# Patient Record
Sex: FEMALE | Race: WHITE | NOT HISPANIC OR LATINO | Employment: FULL TIME | URBAN - METROPOLITAN AREA
[De-identification: names, ages, dates, MRNs, and addresses within clinical notes are randomized per-mention and may not be internally consistent; named-entity substitution may affect disease eponyms.]

---

## 2017-10-26 ENCOUNTER — TELEPHONE (OUTPATIENT)
Dept: FAMILY MEDICINE | Facility: CLINIC | Age: 32
End: 2017-10-26

## 2017-10-26 ENCOUNTER — OFFICE VISIT (OUTPATIENT)
Dept: FAMILY MEDICINE | Facility: CLINIC | Age: 32
End: 2017-10-26
Payer: COMMERCIAL

## 2017-10-26 VITALS
SYSTOLIC BLOOD PRESSURE: 116 MMHG | DIASTOLIC BLOOD PRESSURE: 70 MMHG | HEART RATE: 81 BPM | HEIGHT: 63 IN | BODY MASS INDEX: 20.16 KG/M2 | OXYGEN SATURATION: 99 % | WEIGHT: 113.75 LBS | TEMPERATURE: 99 F

## 2017-10-26 DIAGNOSIS — J34.0 ABSCESS OF NOSE: Primary | ICD-10-CM

## 2017-10-26 DIAGNOSIS — L03.211 FACIAL CELLULITIS: ICD-10-CM

## 2017-10-26 PROCEDURE — 96372 THER/PROPH/DIAG INJ SC/IM: CPT | Mod: S$GLB,,, | Performed by: INTERNAL MEDICINE

## 2017-10-26 PROCEDURE — 99999 PR PBB SHADOW E&M-EST. PATIENT-LVL III: CPT | Mod: PBBFAC,,, | Performed by: INTERNAL MEDICINE

## 2017-10-26 PROCEDURE — 99214 OFFICE O/P EST MOD 30 MIN: CPT | Mod: 25,S$GLB,, | Performed by: INTERNAL MEDICINE

## 2017-10-26 RX ORDER — CEFTRIAXONE 250 MG/1
250 INJECTION, POWDER, FOR SOLUTION INTRAMUSCULAR; INTRAVENOUS
Status: COMPLETED | OUTPATIENT
Start: 2017-10-26 | End: 2017-10-26

## 2017-10-26 RX ORDER — CLINDAMYCIN HYDROCHLORIDE 300 MG/1
300 CAPSULE ORAL EVERY 6 HOURS
Qty: 28 CAPSULE | Refills: 0 | Status: SHIPPED | OUTPATIENT
Start: 2017-10-26 | End: 2017-11-02

## 2017-10-26 RX ORDER — MUPIROCIN 20 MG/G
OINTMENT TOPICAL 2 TIMES DAILY
Qty: 1 G | Refills: 0 | Status: SHIPPED | OUTPATIENT
Start: 2017-10-26 | End: 2021-06-07

## 2017-10-26 RX ADMIN — CEFTRIAXONE 250 MG: 250 INJECTION, POWDER, FOR SOLUTION INTRAMUSCULAR; INTRAVENOUS at 09:10

## 2017-10-26 NOTE — PROGRESS NOTES
SUBJECTIVE     Chief Complaint   Patient presents with    Facial Swelling     nasal swelling x 4 days. painful       HPI  Greg Borja is a 31 y.o. female with multiple medical diagnoses as listed in the medical history and problem list that presents for evaluation of facial swelling x 4 days. Pt reports a feeling of a pimple in her nose  night. Her nose started to swell Monday. She applied peroxide on a q-tip. She continued with warm compresses and even tried apple cider vinegar without improvement. Yesterday the swelling spread just beneath the R eye. She reports pain in the R nostril that sharp and stiff at a 5-6/10 and constant in nature. She has taking Ibuprofen 200 mg x 3 tabs every 6-8 hours with some improvement of symptoms. Denies any fever, chills, or night sweats.     PAST MEDICAL HISTORY:  Past Medical History:   Diagnosis Date    Diabetes mellitus type I     Diabetes mellitus, type 2        PAST SURGICAL HISTORY:  Past Surgical History:   Procedure Laterality Date     SECTION         SOCIAL HISTORY:  Social History     Social History    Marital status:      Spouse name: N/A    Number of children: N/A    Years of education: N/A     Occupational History    Not on file.     Social History Main Topics    Smoking status: Former Smoker     Quit date: 2009    Smokeless tobacco: Not on file    Alcohol use Yes      Comment: occasionally    Drug use: Unknown    Sexual activity: Not on file     Other Topics Concern    Not on file     Social History Narrative    No narrative on file       FAMILY HISTORY:  History reviewed. No pertinent family history.    ALLERGIES AND MEDICATIONS: updated and reviewed.  Review of patient's allergies indicates:   Allergen Reactions    Hydrochlorothiazide Rash     Current Outpatient Prescriptions   Medication Sig Dispense Refill    JOLIVETTE 0.35 mg tablet       NOVOLOG 100 unit/mL injection       clindamycin (CLEOCIN) 300 MG capsule  "Take 1 capsule (300 mg total) by mouth every 6 (six) hours. 28 capsule 0    mupirocin calcium 2% nasl oint (BACTROBAN) 2 % Oint by Nasal route 2 (two) times daily. 1 g 0     Current Facility-Administered Medications   Medication Dose Route Frequency Provider Last Rate Last Dose    cefTRIAXone injection 250 mg  250 mg Intramuscular 1 time in Clinic/HOD Swapna Quinonez MD           ROS  Review of Systems   Constitutional: Negative for chills and fever.   HENT: Positive for facial swelling. Negative for hearing loss and sore throat.    Eyes: Negative for visual disturbance.   Respiratory: Negative for cough and shortness of breath.    Cardiovascular: Negative for chest pain, palpitations and leg swelling.   Gastrointestinal: Positive for nausea. Negative for abdominal pain, constipation, diarrhea and vomiting.   Genitourinary: Negative for dysuria, frequency and urgency.   Musculoskeletal: Negative for arthralgias, joint swelling and myalgias.   Skin: Negative for rash.        R nostril abscess   Neurological: Negative for headaches.   Psychiatric/Behavioral: Negative for agitation and confusion. The patient is not nervous/anxious.          OBJECTIVE     Physical Exam  Vitals:    10/26/17 0832   BP: 116/70   Pulse: 81   Temp: 98.7 °F (37.1 °C)    Body mass index is 20.47 kg/m².  Weight: 51.6 kg (113 lb 12.1 oz)   Height: 5' 2.5" (158.8 cm)     Physical Exam   Constitutional: She is oriented to person, place, and time. She appears well-developed and well-nourished. No distress.   HENT:   Head: Normocephalic and atraumatic.   Right Ear: Hearing, tympanic membrane and external ear normal.   Left Ear: Hearing, tympanic membrane and external ear normal.   Nose: Mucosal edema (right) and sinus tenderness (nasal bridge and R nostril TTP with area of fluctuance at the R nostril with small eschar on the lateral R nostril) present.   Mouth/Throat: Oropharynx is clear and moist. No uvula swelling. No posterior oropharyngeal " edema or posterior oropharyngeal erythema.   Eyes: Conjunctivae and EOM are normal. Right eye exhibits no discharge. Left eye exhibits no discharge. No scleral icterus.   Neck: Normal range of motion. Neck supple. No JVD present. No tracheal deviation present.   Cardiovascular: Normal rate, regular rhythm and intact distal pulses.  Exam reveals no gallop and no friction rub.    No murmur heard.  Pulmonary/Chest: Effort normal and breath sounds normal. No respiratory distress. She has no wheezes.   Abdominal: Soft. Bowel sounds are normal. She exhibits no distension and no mass. There is no tenderness. There is no rebound and no guarding.   Musculoskeletal: Normal range of motion. She exhibits edema (R nostril and inferior to R eye). She exhibits no tenderness or deformity.   Neurological: She is alert and oriented to person, place, and time. She exhibits normal muscle tone. Coordination normal.   Skin: Skin is warm and dry. No rash noted. There is erythema (R nostril and inferior to R eye).   Psychiatric: She has a normal mood and affect. Her behavior is normal. Judgment and thought content normal.         Health Maintenance       Date Due Completion Date    Lipid Panel 1984 ---    TETANUS VACCINE 10/27/2002 ---    Pap Smear with HPV Cotest 10/27/2005 ---    Influenza Vaccine 08/01/2017 ---            ASSESSMENT     31 y.o. female with     1. Abscess of nose    2. Facial cellulitis        PLAN:     1. Abscess of nose  - Pt advised to take Abx to completion  - mupirocin calcium 2% nasl oint (BACTROBAN) 2 % Oint; by Nasal route 2 (two) times daily.  Dispense: 1 g; Refill: 0  - clindamycin (CLEOCIN) 300 MG capsule; Take 1 capsule (300 mg total) by mouth every 6 (six) hours.  Dispense: 28 capsule; Refill: 0  - cefTRIAXone injection 250 mg; Inject 250 mg into the muscle one time.  - Seek medical attention for any worsening erythema with red streaking, edema, drainage, pain/tenderness, fever, chills, or night  sweats    2. Facial cellulitis  - clindamycin (CLEOCIN) 300 MG capsule; Take 1 capsule (300 mg total) by mouth every 6 (six) hours.  Dispense: 28 capsule; Refill: 0  - cefTRIAXone injection 250 mg; Inject 250 mg into the muscle one time.        RTC in 5-7 days for any acute worsening of current condition or failure to improve     Swapna Quinonez MD  10/26/2017 8:47 AM        No Follow-up on file.

## 2017-10-26 NOTE — TELEPHONE ENCOUNTER
----- Message from Waleska Ocampo sent at 10/26/2017  9:28 AM CDT -----  Contact: Parisa with [facility]  Refill on the regular mupirocin calcium 2% nasl oint (BACTROBAN) 2 % Oint . Parisa can be reached at [phone].        Thanks,

## 2017-10-26 NOTE — TELEPHONE ENCOUNTER
----- Message from Nadine Weeks sent at 10/26/2017 12:24 PM CDT -----  Nahum madrigal [facility] would like to discuss patient's medication. She states that mupirocin calcium 2% nasl oint (BACTROBAN) 2 % Oint has been discontinued by the manu factor and is requesting to prescribe the regular one. She can be reached at [phone].

## 2017-10-26 NOTE — TELEPHONE ENCOUNTER
Patient requesting medication change, per pharmacy.  Need to have bactroban changed to another medication.  Please advise.

## 2017-10-26 NOTE — TELEPHONE ENCOUNTER
----- Message from Pily Hamilton sent at 10/26/2017 10:25 AM CDT -----  Contact: Self   Patient says the pharmacy told her they need a change in her medication because of her insurance. Please call at [phone].      mupirocin calcium 2% nasl oint (BACTROBAN) 2 % Oint

## 2018-12-13 LAB
HPV, HIGH-RISK: NEGATIVE
PAP SMEAR: ABNORMAL

## 2019-08-08 ENCOUNTER — PATIENT OUTREACH (OUTPATIENT)
Dept: ADMINISTRATIVE | Facility: HOSPITAL | Age: 34
End: 2019-08-08

## 2019-08-08 RX ORDER — INSULIN ASPART 100 [IU]/ML
INJECTION, SUSPENSION SUBCUTANEOUS
COMMUNITY
End: 2019-08-30

## 2019-08-08 RX ORDER — INSULIN ASPART 100 [IU]/ML
INJECTION, SOLUTION INTRAVENOUS; SUBCUTANEOUS
COMMUNITY
Start: 2018-11-09 | End: 2020-01-29

## 2019-08-08 RX ORDER — ACETAMINOPHEN AND CODEINE PHOSPHATE 120; 12 MG/5ML; MG/5ML
0.35 SOLUTION ORAL
COMMUNITY
Start: 2018-12-06

## 2019-08-26 LAB
LEFT EYE DM RETINOPATHY: NEGATIVE
RIGHT EYE DM RETINOPATHY: NEGATIVE

## 2019-08-30 ENCOUNTER — OFFICE VISIT (OUTPATIENT)
Dept: ENDOCRINOLOGY | Facility: CLINIC | Age: 34
End: 2019-08-30
Payer: COMMERCIAL

## 2019-08-30 ENCOUNTER — PATIENT MESSAGE (OUTPATIENT)
Dept: ENDOCRINOLOGY | Facility: CLINIC | Age: 34
End: 2019-08-30

## 2019-08-30 VITALS
DIASTOLIC BLOOD PRESSURE: 70 MMHG | HEART RATE: 80 BPM | WEIGHT: 119.06 LBS | SYSTOLIC BLOOD PRESSURE: 116 MMHG | BODY MASS INDEX: 21.91 KG/M2 | HEIGHT: 62 IN

## 2019-08-30 DIAGNOSIS — E10.65 TYPE 1 DIABETES MELLITUS WITH HYPERGLYCEMIA: Primary | ICD-10-CM

## 2019-08-30 PROCEDURE — 3008F BODY MASS INDEX DOCD: CPT | Mod: CPTII,S$GLB,, | Performed by: INTERNAL MEDICINE

## 2019-08-30 PROCEDURE — 99204 OFFICE O/P NEW MOD 45 MIN: CPT | Mod: S$GLB,,, | Performed by: INTERNAL MEDICINE

## 2019-08-30 PROCEDURE — 99999 PR PBB SHADOW E&M-EST. PATIENT-LVL III: CPT | Mod: PBBFAC,,, | Performed by: INTERNAL MEDICINE

## 2019-08-30 PROCEDURE — 3008F PR BODY MASS INDEX (BMI) DOCUMENTED: ICD-10-PCS | Mod: CPTII,S$GLB,, | Performed by: INTERNAL MEDICINE

## 2019-08-30 PROCEDURE — 3045F PR MOST RECENT HEMOGLOBIN A1C LEVEL 7.0-9.0%: ICD-10-PCS | Mod: CPTII,S$GLB,, | Performed by: INTERNAL MEDICINE

## 2019-08-30 PROCEDURE — 99204 PR OFFICE/OUTPT VISIT, NEW, LEVL IV, 45-59 MIN: ICD-10-PCS | Mod: S$GLB,,, | Performed by: INTERNAL MEDICINE

## 2019-08-30 PROCEDURE — 99999 PR PBB SHADOW E&M-EST. PATIENT-LVL III: ICD-10-PCS | Mod: PBBFAC,,, | Performed by: INTERNAL MEDICINE

## 2019-08-30 PROCEDURE — 3045F PR MOST RECENT HEMOGLOBIN A1C LEVEL 7.0-9.0%: CPT | Mod: CPTII,S$GLB,, | Performed by: INTERNAL MEDICINE

## 2019-08-30 NOTE — PROGRESS NOTES
Subjective:      Chief Complaint: Type 1 diabetes mellitus    She is new to me and to endocrinology clinic    HPI: Greg Borja is a 33 y.o. female who is here for an initial evaluation for type 1 diabetes mellitus.    With regards to the diabetes:    Current symptoms/problems include hyperglycemia and hypoglycemia  and have been stable. Symptoms have been present for several years. She recently switched insurance, and was having trouble getting an appointment with her endocrinologist, so she decided to switch to [facility]. Because of her new job, things have been very hectic, and she admits to forgetting to check BG pre-meals and bolusing occasionally. Her glucose control has been more erratic than is typical due to this.    The patient was initially diagnosed with Type 1 diabetes mellitus: Age 11    Known diabetic complications: none  Cardiovascular risk factors: Diabetes  Current diabetic medications include:   Omnipod with U-100 Novolog: TDD 34.8 units/day  Insulin pump downloaded and reviewed. See media tab.                  Settings:  Basal rate:   12A: 0.6  5A:   1.3  8A:   0.7  3P:   1.2  6P:   0.85  Bolus dose/Carb ratio: 8.0  ISF for Correction Dose: 40  IAT: 2 hours  BG target      Pump type:    Infusion set type: Omnipod  Change infusion set: Every 3 days  Change insertion site: with change of infusion set.   Location of insertion site: Abdomen    Prior visit with dietician: has seen DM educator at [facility]    Current monitoring regimen: home blood tests - 3-4 times daily  Home blood sugar records: See above.  Any episodes of hypoglycemia? yes - a few after bolusing without a BG. Some after corrections.    Diabetic Health Maintenance:        HbA1c < 7.0%: No        Eye exam within last year: Yes in January, 2019 - normal        Foot exam within last year: Yes in January, 2019; denies any problems with her feet or neuropathy.        Urine microalbumin/creatinine within last year: Yes in January, 2019  - reported normal.        Blood pressure <130/80:  No          BP Readings from Last 3 Encounters:   08/30/19 116/70   10/26/17 116/70   05/22/15 110/60      Use of ACE/ARB: No, Not Indicated        Checked Weight: Yes; BM 21.77          Wt Readings from Last 10 Encounters:   08/30/19 54 kg (119 lb 0.8 oz)   10/26/17 51.6 kg (113 lb 12.1 oz)   05/22/15 50.1 kg (110 lb 8 oz)      Weight trend: stable        Checked lipids within last year: Checked by outside endocrinologist in January, reports they were normal. Not fasting today for repeat.   Statin drug:  No, Not Indicated        Glucagon emergency kit?: No        Medical alert tag?: No    Diabetes Management Status    Statin: Not taking  ACE/ARB: Not taking    Screening or Prevention Patient's value Goal Complete/Controlled?   HgA1C Testing and Control   Lab Results   Component Value Date    HGBA1C 7.7 (H) 08/30/2019      Annually/Less than 8% No   Lipid profile Most Recent Lipid Panel Health Maintenance Topic Completion: Not Found Annually No   LDL control No results found for: LDLCALC Annually/Less than 100 mg/dl  No   Nephropathy screening No results found for: LABMICR  No results found for: PROTEINUA Annually No   Blood pressure BP Readings from Last 1 Encounters:   08/30/19 116/70    Less than 140/90 Yes   Dilated retinal exam Most Recent Eye Exam Date: Not Found Annually No   Foot exam   Most Recent Foot Exam Date: Not Found Annually No        Lab Results   Component Value Date    HGBA1C 7.7 (H) 08/30/2019         Reviewed past medical, family, social history and updated as appropriate.    Review of Systems   Constitutional: Negative for unexpected weight change.   Eyes: Negative for visual disturbance.   Respiratory: Negative for shortness of breath.    Cardiovascular: Negative for chest pain.   Gastrointestinal: Negative for abdominal pain.   Genitourinary: Negative for urgency.   Musculoskeletal: Negative for arthralgias.   Skin: Negative for wound.    Neurological: Negative for headaches.   Hematological: Does not bruise/bleed easily.   Psychiatric/Behavioral: Negative for sleep disturbance.     Objective:     Vitals:    08/30/19 1123   BP: 116/70   Pulse: 80     BP Readings from Last 5 Encounters:   08/30/19 116/70   10/26/17 116/70   05/22/15 110/60         Physical Exam   Constitutional: She is oriented to person, place, and time. She appears well-developed.   HENT:   Right Ear: External ear normal.   Left Ear: External ear normal.   Nose: Nose normal.   Hearing grossly normal  Dentition grossly normal   Eyes: Right eye exhibits no discharge. Left eye exhibits no discharge.   Neck: No tracheal deviation present. No thyromegaly present.   Cardiovascular: Normal rate.   No murmur heard.  Pulmonary/Chest: Effort normal and breath sounds normal.   Abdominal: Soft. She exhibits no mass. There is no tenderness.   Musculoskeletal: She exhibits no edema.   No digital clubbing or extremity cyanosis  R ankle swelling - chronic.  Deferred foot exam as done by outside physician recently.   Neurological: She is alert and oriented to person, place, and time. Coordination normal.   Skin: No rash noted.   Some abdominal lipohypertrophy noted.   Omnipod in RLQ - clean and intact.   Psychiatric: She has a normal mood and affect. Her behavior is normal.   Alert and oriented to person, place, and situation.   Nursing note and vitals reviewed.      Wt Readings from Last 10 Encounters:   08/30/19 1123 54 kg (119 lb 0.8 oz)   10/26/17 0832 51.6 kg (113 lb 12.1 oz)   05/22/15 1608 50.1 kg (110 lb 8 oz)       Lab Results   Component Value Date    HGBA1C 7.7 (H) 08/30/2019     No results found for: CHOL, HDL, LDLCALC, TRIG, CHOLHDL  Lab Results   Component Value Date     08/30/2019    K 4.0 08/30/2019     08/30/2019    CO2 26 08/30/2019    GLU 75 08/30/2019    BUN 7 08/30/2019    CREATININE 0.8 08/30/2019    CALCIUM 8.5 (L) 08/30/2019    PROT 6.9 08/30/2019    ALBUMIN 3.9  08/30/2019    BILITOT 0.4 08/30/2019    ALKPHOS 63 08/30/2019    AST 22 08/30/2019    ALT 23 08/30/2019    ANIONGAP 7 (L) 08/30/2019    ESTGFRAFRICA >60.0 08/30/2019    EGFRNONAA >60.0 08/30/2019    TSH 0.867 08/30/2019      No results found for: MICALBCREAT    Assessment/Plan:     Type 1 diabetes mellitus with hyperglycemia  Reviewed goals of therapy are to get the best control we can without hypoglycemia. The major issue is missed or late boluses, which we discussed.     Will get 7-day CGMS to better evaluate BG control. Limited data on current pump settings due to infrequent BG checks during the day with recent job change and stress. She plans to start checking more often. She will consider getting on a personal CGM, but wants to think about it first. Dexcom G6 would be preferred.    Insufficient data to suggest significant setting changes at the moment. Will get CGMS data before deciding (next week).  Settings:  Basal rate:   12A: 0.6  5A:   1.3  8A:   0.7  3P:   1.2  6P:   0.85  Bolus dose/Carb ratio: 8.0  ISF for Correction Dose: 40  IAT: 2 hours > 2.5 hours  BG target   > 100-120     Reviewed patient's current insulin regimen. Clarified proper insulin dose and timing in relation to meals, etc. Advised to bolus for all carbs, and always check BG prior to meals.    Advised frequent self blood glucose monitoring. Patient encouraged to document glucose results and bring them to every clinic visit.    Hypoglycemia precautions discussed.     Close adherence to lifestyle changes recommended.      Eyes: UTD  Feet: UTD; will be due next visit  Kidneys: Will be due next visit  HbA1c: Check now  Lipids: repeat next visit (not fasting today)  Thyroid: Check TSH  Celiac: had celiac testing done at [facility] within the past few years - negative.      RTC in 6 months

## 2019-08-30 NOTE — ASSESSMENT & PLAN NOTE
Reviewed goals of therapy are to get the best control we can without hypoglycemia. The major issue is missed or late boluses, which we discussed.     Will get 7-day CGMS to better evaluate BG control. Limited data on current pump settings due to infrequent BG checks during the day with recent job change and stress. She plans to start checking more often. She will consider getting on a personal CGM, but wants to think about it first. Dexcom G6 would be preferred.    Insufficient data to suggest significant setting changes at the moment. Will get CGMS data before deciding (next week).  Settings:  Basal rate:   12A: 0.6  5A:   1.3  8A:   0.7  3P:   1.2  6P:   0.85  Bolus dose/Carb ratio: 8.0  ISF for Correction Dose: 40  IAT: 2 hours > 2.5 hours  BG target   > 100-120     Reviewed patient's current insulin regimen. Clarified proper insulin dose and timing in relation to meals, etc. Advised to bolus for all carbs, and always check BG prior to meals.    Advised frequent self blood glucose monitoring. Patient encouraged to document glucose results and bring them to every clinic visit.    Hypoglycemia precautions discussed.     Close adherence to lifestyle changes recommended.      Eyes: UTD  Feet: UTD; will be due next visit  Kidneys: Will be due next visit  HbA1c: Check now  Lipids: repeat next visit (not fasting today)  Thyroid: Check TSH  Celiac: had celiac testing done at [facility] within the past few years - negative.

## 2019-09-04 ENCOUNTER — CLINICAL SUPPORT (OUTPATIENT)
Dept: ENDOCRINOLOGY | Facility: CLINIC | Age: 34
End: 2019-09-04
Payer: COMMERCIAL

## 2019-09-04 DIAGNOSIS — E10.65 TYPE 1 DIABETES MELLITUS WITH HYPERGLYCEMIA: Primary | ICD-10-CM

## 2019-09-04 NOTE — PROGRESS NOTES
".DIABETES EDUCATOR NOTE   PLACEMENT OF FREESTYLE KALEY PRO SENSOR  CONTINOUS GLUCOSE MONITORING SYSTEM (CGMS)    Patient is here in clinic today for placement of continuous glucose monitoring sensor.      Each patient verified that they were here for CGMS procedure ordered by their provider and that they have a working glucose meter and supplies at home.   Patient will be provided with a Freestyle Kaley Sensor and a copy of the Continuous Glucose Monitoring Patient Log to fill out during the study.   A detailed  explanation of Continuous Glucose Monitoring was  provided. Patient informed that this is a blind procedure and that they will not actually see the blood sugar tracing in real time.  Reviewed with patient the [employer] patient education handout called "Your Freestyle Kaley Pro sensor: What you need to know" to review self-care during the study to avoid sensor loosening or removal ie... Bathing, Swimming, dressing, and exercising.   Instructed patient to check blood sugar using home glucometer and to record the following on provided patient log sheets:Blood sugar taken at home, Meals and snacks, Activity, and Diabetes medications taken and dosage    Patient was brought to a private location.  Arm for insertion was selected and prepared and allowed to dry. Glucose Sensor Serial Number [id number]  was inserted to back of patient's LEFT upper arm.    The following forms  were given and reviewed in detail with patient and all questions answered.   · Continuous Glucose Monitoring Patient Log #[id number]  · Freestyle [employer] Patient Handout "Your Freestyle Kaley Pro Sensor: What you need to know"     Instructions held in a group: Time: 15 min   Insertion of sensor done individually in private:  Time: 5 minutes         "

## 2019-09-10 ENCOUNTER — CLINICAL SUPPORT (OUTPATIENT)
Dept: ENDOCRINOLOGY | Facility: CLINIC | Age: 34
End: 2019-09-10
Payer: COMMERCIAL

## 2019-09-10 DIAGNOSIS — E10.65 TYPE 1 DIABETES MELLITUS WITH HYPERGLYCEMIA: ICD-10-CM

## 2019-09-10 PROCEDURE — 99499 NO LOS: ICD-10-PCS | Mod: S$GLB,,, | Performed by: INTERNAL MEDICINE

## 2019-09-10 PROCEDURE — 99499 UNLISTED E&M SERVICE: CPT | Mod: S$GLB,,, | Performed by: INTERNAL MEDICINE

## 2019-09-10 NOTE — PROGRESS NOTES
.DIABETES EDUCATOR NOTE   Return of the Freestyle Barbi Pro Sensor and Patient Log.    Patient returned to clinic today to return Glucose Sensor and signed patient log form used in [facility] procedure.    The CGMS Sensor will be scanned and downloaded. All reports will be imported into the patient's electronic medical record.    Endocrine Nurse Practitioner will complete data interpretation and make recommendations; will forward recommendations to the ordering provider for follow up with patient.

## 2019-09-12 ENCOUNTER — TELEPHONE (OUTPATIENT)
Dept: ENDOCRINOLOGY | Facility: CLINIC | Age: 34
End: 2019-09-12

## 2019-09-12 ENCOUNTER — PATIENT MESSAGE (OUTPATIENT)
Dept: ENDOCRINOLOGY | Facility: CLINIC | Age: 34
End: 2019-09-12

## 2019-09-12 DIAGNOSIS — E10.65 TYPE 1 DIABETES MELLITUS WITH HYPERGLYCEMIA: Primary | ICD-10-CM

## 2019-09-30 ENCOUNTER — TELEPHONE (OUTPATIENT)
Dept: ENDOCRINOLOGY | Facility: CLINIC | Age: 34
End: 2019-09-30

## 2020-01-29 DIAGNOSIS — E10.65 TYPE 1 DIABETES MELLITUS WITH HYPERGLYCEMIA: Primary | ICD-10-CM

## 2020-01-29 RX ORDER — INSULIN ASPART 100 [IU]/ML
INJECTION, SOLUTION INTRAVENOUS; SUBCUTANEOUS
Qty: 60 ML | Refills: 11 | Status: SHIPPED | OUTPATIENT
Start: 2020-01-29 | End: 2020-08-03 | Stop reason: SDUPTHER

## 2020-02-05 ENCOUNTER — TELEPHONE (OUTPATIENT)
Dept: ENDOCRINOLOGY | Facility: CLINIC | Age: 35
End: 2020-02-05

## 2020-02-11 ENCOUNTER — TELEPHONE (OUTPATIENT)
Dept: ENDOCRINOLOGY | Facility: CLINIC | Age: 35
End: 2020-02-11

## 2020-02-11 NOTE — TELEPHONE ENCOUNTER
Spoke with [employer] states they were waiting on an Addendum to Prior Authorization to be faxed. Third time faxing paperwork to company. Request came from [facility].

## 2020-03-04 LAB
HPV16+18+H RISK 12 DNA CVX-IMP: NEGATIVE
PAP SMEAR: NORMAL

## 2020-06-10 ENCOUNTER — OFFICE VISIT (OUTPATIENT)
Dept: FAMILY MEDICINE | Facility: CLINIC | Age: 35
End: 2020-06-10
Payer: COMMERCIAL

## 2020-06-10 VITALS
DIASTOLIC BLOOD PRESSURE: 80 MMHG | HEIGHT: 62 IN | OXYGEN SATURATION: 99 % | HEART RATE: 85 BPM | SYSTOLIC BLOOD PRESSURE: 128 MMHG | BODY MASS INDEX: 21.59 KG/M2 | WEIGHT: 117.31 LBS | TEMPERATURE: 99 F

## 2020-06-10 DIAGNOSIS — H65.91 FLUID LEVEL BEHIND TYMPANIC MEMBRANE OF RIGHT EAR: ICD-10-CM

## 2020-06-10 DIAGNOSIS — E10.65 TYPE 1 DIABETES MELLITUS WITH HYPERGLYCEMIA: ICD-10-CM

## 2020-06-10 DIAGNOSIS — Z23 NEED FOR PNEUMOCOCCAL VACCINATION: ICD-10-CM

## 2020-06-10 DIAGNOSIS — J01.90 ACUTE SINUSITIS WITH SYMPTOMS GREATER THAN 10 DAYS: Primary | ICD-10-CM

## 2020-06-10 PROCEDURE — 99999 PR PBB SHADOW E&M-EST. PATIENT-LVL III: ICD-10-PCS | Mod: PBBFAC,,, | Performed by: INTERNAL MEDICINE

## 2020-06-10 PROCEDURE — 3008F PR BODY MASS INDEX (BMI) DOCUMENTED: ICD-10-PCS | Mod: CPTII,S$GLB,, | Performed by: INTERNAL MEDICINE

## 2020-06-10 PROCEDURE — 3008F BODY MASS INDEX DOCD: CPT | Mod: CPTII,S$GLB,, | Performed by: INTERNAL MEDICINE

## 2020-06-10 PROCEDURE — 99999 PR PBB SHADOW E&M-EST. PATIENT-LVL III: CPT | Mod: PBBFAC,,, | Performed by: INTERNAL MEDICINE

## 2020-06-10 PROCEDURE — 99214 OFFICE O/P EST MOD 30 MIN: CPT | Mod: 25,S$GLB,, | Performed by: INTERNAL MEDICINE

## 2020-06-10 PROCEDURE — 90471 PNEUMOCOCCAL POLYSACCHARIDE VACCINE 23-VALENT =>2YO SQ IM: ICD-10-PCS | Mod: S$GLB,,, | Performed by: INTERNAL MEDICINE

## 2020-06-10 PROCEDURE — 3051F HG A1C>EQUAL 7.0%<8.0%: CPT | Mod: CPTII,S$GLB,, | Performed by: INTERNAL MEDICINE

## 2020-06-10 PROCEDURE — 99214 PR OFFICE/OUTPT VISIT, EST, LEVL IV, 30-39 MIN: ICD-10-PCS | Mod: 25,S$GLB,, | Performed by: INTERNAL MEDICINE

## 2020-06-10 PROCEDURE — 90471 IMMUNIZATION ADMIN: CPT | Mod: S$GLB,,, | Performed by: INTERNAL MEDICINE

## 2020-06-10 PROCEDURE — 90732 PNEUMOCOCCAL POLYSACCHARIDE VACCINE 23-VALENT =>2YO SQ IM: ICD-10-PCS | Mod: S$GLB,,, | Performed by: INTERNAL MEDICINE

## 2020-06-10 PROCEDURE — 3051F PR MOST RECENT HEMOGLOBIN A1C LEVEL 7.0 - < 8.0%: ICD-10-PCS | Mod: CPTII,S$GLB,, | Performed by: INTERNAL MEDICINE

## 2020-06-10 PROCEDURE — 90732 PPSV23 VACC 2 YRS+ SUBQ/IM: CPT | Mod: S$GLB,,, | Performed by: INTERNAL MEDICINE

## 2020-06-10 RX ORDER — AMOXICILLIN AND CLAVULANATE POTASSIUM 875; 125 MG/1; MG/1
1 TABLET, FILM COATED ORAL EVERY 12 HOURS
Qty: 20 TABLET | Refills: 0 | Status: SHIPPED | OUTPATIENT
Start: 2020-06-10 | End: 2020-06-20

## 2020-06-10 NOTE — PROGRESS NOTES
After obtaining consent, and per orders of Dr. Quinonez, injection of [id number] given by Maria Luz West. Patient instructed to remain in clinic for 20 minutes afterwards, and to report any adverse reaction to me immediately.

## 2020-06-10 NOTE — PROGRESS NOTES
SUBJECTIVE     Chief Complaint   Patient presents with    Sinusitis       HPI  Greg Borja is a 34 y.o. female with multiple medical diagnoses as listed in the medical history and problem list that presents for evaluation of URI x 1 month. Pt reports headaches, B/L eye itch/burn, nausea, dry cough, and scratchy throat. Denies any fever, chills, or night sweats. Pt has been taking Claritin, humidifier, eucalyptus, and vapor rub with minimal relief of symptoms. Denies any sick contacts/recent travel.    PAST MEDICAL HISTORY:  Past Medical History:   Diagnosis Date    Diabetes mellitus type I     Diabetes mellitus, type 2        PAST SURGICAL HISTORY:  Past Surgical History:   Procedure Laterality Date     SECTION         SOCIAL HISTORY:  Social History     Socioeconomic History    Marital status:      Spouse name: Not on file    Number of children: Not on file    Years of education: Not on file    Highest education level: Not on file   Occupational History    Not on file   Social Needs    Financial resource strain: Not hard at all    Food insecurity:     Worry: Never true     Inability: Never true    Transportation needs:     Medical: No     Non-medical: No   Tobacco Use    Smoking status: Former Smoker     Last attempt to quit: 2009     Years since quittin.0   Substance and Sexual Activity    Alcohol use: Yes     Frequency: 2-3 times a week     Drinks per session: 1 or 2     Binge frequency: Never     Comment: occasionally    Drug use: Not on file    Sexual activity: Not on file   Lifestyle    Physical activity:     Days per week: 2 days     Minutes per session: 30 min    Stress: Not at all   Relationships    Social connections:     Talks on phone: More than three times a week     Gets together: Twice a week     Attends [employer] service: Not on file     Active member of club or organization: Yes     Attends meetings of clubs or organizations: 1 to 4 times per year      Relationship status:    Other Topics Concern    Not on file   Social History Narrative    Not on file       FAMILY HISTORY:  History reviewed. No pertinent family history.    ALLERGIES AND MEDICATIONS: updated and reviewed.  Review of patient's allergies indicates:   Allergen Reactions    Hydrochlorothiazide Rash     Current Outpatient Medications   Medication Sig Dispense Refill    JOLIVETTE 0.35 mg tablet       NOVOLOG U-100 INSULIN ASPART 100 unit/mL injection USE AS DIRECTED VIA INSULIN PUMP. USE UP  UNITS PER DAY 60 mL 11    amoxicillin-clavulanate 875-125mg (AUGMENTIN) 875-125 mg per tablet Take 1 tablet by mouth every 12 (twelve) hours. for 10 days 20 tablet 0    mupirocin (BACTROBAN) 2 % ointment Apply topically 2 (two) times daily. (Patient not taking: Reported on 6/10/2020) 1 g 0    mupirocin calcium 2% nasl oint (BACTROBAN) 2 % Oint by Nasal route 2 (two) times daily. (Patient not taking: Reported on 6/10/2020) 1 g 0    norethindrone (MICRONOR) 0.35 mg tablet Take 0.35 mg by mouth.       No current facility-administered medications for this visit.        ROS  Review of Systems   Constitutional: Negative for activity change and unexpected weight change.   HENT: Negative for hearing loss, rhinorrhea and trouble swallowing.    Eyes: Negative for discharge and visual disturbance.   Respiratory: Negative for chest tightness and wheezing.    Cardiovascular: Negative for chest pain and palpitations.   Gastrointestinal: Positive for nausea. Negative for blood in stool, constipation, diarrhea and vomiting.   Endocrine: Negative for polydipsia and polyuria.   Genitourinary: Negative for difficulty urinating, dysuria, hematuria and menstrual problem.   Musculoskeletal: Negative for arthralgias, joint swelling and neck pain.   Skin: Negative for rash and wound.   Neurological: Positive for headaches. Negative for weakness.   Psychiatric/Behavioral: Negative for confusion and dysphoric mood.  "        OBJECTIVE     Physical Exam  Vitals:    06/10/20 1010   BP: 128/80   Pulse: 85   Temp: 98.5 °F (36.9 °C)    Body mass index is 21.45 kg/m².  Weight: 53.2 kg (117 lb 4.6 oz)   Height: 5' 2" (157.5 cm)     Physical Exam   Constitutional: She is oriented to person, place, and time. She appears well-developed and well-nourished. No distress.   HENT:   Head: Normocephalic and atraumatic.   Right Ear: Hearing and external ear normal. A middle ear effusion is present.   Left Ear: Hearing, tympanic membrane and external ear normal.   Nose: Right sinus exhibits maxillary sinus tenderness and frontal sinus tenderness. Left sinus exhibits maxillary sinus tenderness and frontal sinus tenderness.   Mouth/Throat: Oropharynx is clear and moist.   Eyes: Conjunctivae and EOM are normal. Right eye exhibits no discharge. Left eye exhibits no discharge. No scleral icterus.   Neck: Normal range of motion. Neck supple. No JVD present. No tracheal deviation present.   Cardiovascular: Normal rate, regular rhythm and intact distal pulses. Exam reveals no gallop and no friction rub.   No murmur heard.  Pulmonary/Chest: Effort normal and breath sounds normal. No respiratory distress. She has no wheezes.   Abdominal: Soft. Bowel sounds are normal. She exhibits no distension and no mass. There is no tenderness. There is no rebound and no guarding.   Musculoskeletal: Normal range of motion. She exhibits no edema, tenderness or deformity.   Neurological: She is alert and oriented to person, place, and time. She exhibits normal muscle tone. Coordination normal.   Skin: Skin is warm and dry. No rash noted. No erythema.   Psychiatric: She has a normal mood and affect. Her behavior is normal. Judgment and thought content normal.         Health Maintenance       Date Due Completion Date    Lipid Panel 1984 ---    Eye Exam 10/27/1994 ---    HIV Screening 10/27/1999 ---    TETANUS VACCINE 10/27/2002 ---    Pneumococcal Vaccine (Medium " Risk) (1 of 1 - PPSV23) 10/27/2003 ---    Urine Microalbumin 08/10/2016 8/10/2015    Hemoglobin A1c 02/29/2020 8/30/2019    Foot Exam 08/30/2020 8/30/2019    Influenza Vaccine (Season Ended) 09/01/2020 9/26/2011    Pap Smear with HPV Cotest 12/13/2021 12/13/2018            ASSESSMENT     34 y.o. female with     1. Acute sinusitis with symptoms greater than 10 days    2. Fluid level behind tympanic membrane of right ear    3. Type 1 diabetes mellitus with hyperglycemia    4. Need for pneumococcal vaccination        PLAN:     1. Acute sinusitis with symptoms greater than 10 days  - Pt advised to take Abx to completion  - Continue symptomatic treatment with rest, increase fluid intake, tylenol or ibuprofen PRN fever(temp >/= 100.4) or body aches. Okay to take OTC antihistamines, i.e. Bendaryl, Claritin, Allegra, etc. as needed.  - Okay to gargle with warm, salt water or use throat lozenges as needed  - amoxicillin-clavulanate 875-125mg (AUGMENTIN) 875-125 mg per tablet; Take 1 tablet by mouth every 12 (twelve) hours. for 10 days  Dispense: 20 tablet; Refill: 0    2. Fluid level behind tympanic membrane of right ear  - Pt to take antihistamines as above    3. Type 1 diabetes mellitus with hyperglycemia  - Continue management per Endocrinology  - CBC auto differential; Future  - Comprehensive metabolic panel; Future  - Hemoglobin A1C; Future  - TSH; Future  - Lipid Panel; Future  - Microalbumin/creatinine urine ratio; Future    4. Need for pneumococcal vaccination  - (In Office Administered) Pneumococcal Polysaccharide Vaccine (23 Valent) (SQ/IM)        RTC in 1-2 weeks as needed for any acute worsening of current condition or failure to improve       Swapna Quinonez MD  06/10/2020 10:18 AM        No follow-ups on file.

## 2020-06-16 ENCOUNTER — PATIENT OUTREACH (OUTPATIENT)
Dept: ADMINISTRATIVE | Facility: HOSPITAL | Age: 35
End: 2020-06-16

## 2020-06-24 ENCOUNTER — TELEPHONE (OUTPATIENT)
Dept: FAMILY MEDICINE | Facility: CLINIC | Age: 35
End: 2020-06-24

## 2020-06-24 DIAGNOSIS — R11.0 NAUSEA: ICD-10-CM

## 2020-06-24 DIAGNOSIS — J34.9 SINUS DISEASE: Primary | ICD-10-CM

## 2020-06-24 RX ORDER — PANTOPRAZOLE SODIUM 20 MG/1
20 TABLET, DELAYED RELEASE ORAL DAILY
Qty: 30 TABLET | Refills: 0 | Status: SHIPPED | OUTPATIENT
Start: 2020-06-24 | End: 2020-08-03

## 2020-06-24 NOTE — TELEPHONE ENCOUNTER
----- Message from Rhea Silva sent at 6/24/2020  2:28 PM CDT -----  Name of Who is Calling: JANIE JOHNSON [[id number]]    What is the request in detail: Would like to inform provider that she finished her course of antibiotics but she is still experiencing stomach issues, sinus issues. Please contact to further discuss and advise      Can the clinic reply by [facility]: yes    What Number to Call Back if not in [facility]: [phone] (preferred)

## 2020-06-24 NOTE — TELEPHONE ENCOUNTER
Trial course of Protonix called in in case 2/2 GERD. She'll need to call back if no improvement in 2-4 weeks.

## 2020-06-24 NOTE — TELEPHONE ENCOUNTER
Please inform pt that an ENT referral has been placed. Does she mean persistent nausea when she says stomach problems?

## 2020-06-24 NOTE — TELEPHONE ENCOUNTER
Last Office Visit Info:   The patient's last visit with Swapna Quinonez MD was on 6/10/2020.    The patient's last visit in current department was on 6/10/2020.        Last CBC Results:   Lab Results   Component Value Date    WBC 6.27 08/30/2019    HGB 13.7 08/30/2019    HCT 42.1 08/30/2019     08/30/2019       Last CMP Results  Lab Results   Component Value Date     08/30/2019    K 4.0 08/30/2019     08/30/2019    CO2 26 08/30/2019    BUN 7 08/30/2019    CREATININE 0.8 08/30/2019    CALCIUM 8.5 (L) 08/30/2019    ALBUMIN 3.9 08/30/2019    AST 22 08/30/2019    ALT 23 08/30/2019       Last Lipids  No results found for: CHOL, TRIG, HDL, LDLCALC    Last A1C  Lab Results   Component Value Date    HGBA1C 7.7 (H) 08/30/2019       Last TSH  Lab Results   Component Value Date    TSH 0.867 08/30/2019         Current Med Refills  Medication List with Changes/Refills   Current Medications    JOLIVETTE 0.35 MG TABLET           Start Date: 5/4/2015  End Date: --    MUPIROCIN (BACTROBAN) 2 % OINTMENT    Apply topically 2 (two) times daily.       Start Date: 10/26/2017End Date: --    MUPIROCIN CALCIUM 2% NASL OINT (BACTROBAN) 2 % OINT    by Nasal route 2 (two) times daily.       Start Date: 10/26/2017End Date: --    NORETHINDRONE (MICRONOR) 0.35 MG TABLET    Take 0.35 mg by mouth.       Start Date: 12/6/2018 End Date: --    NOVOLOG U-100 INSULIN ASPART 100 UNIT/ML INJECTION    USE AS DIRECTED VIA INSULIN PUMP. USE UP  UNITS PER DAY       Start Date: 1/29/2020 End Date: --

## 2020-06-24 NOTE — TELEPHONE ENCOUNTER
Pt states she is having recurrent nausea.  Not sure if caused by the drainage from sinus.  No other stomach problems

## 2020-06-26 ENCOUNTER — TELEPHONE (OUTPATIENT)
Dept: OTOLARYNGOLOGY | Facility: CLINIC | Age: 35
End: 2020-06-26

## 2020-06-26 NOTE — TELEPHONE ENCOUNTER
Called Patient and no answer, left voicemail regarding an appointment that was made today to see Dr. Hung on Monday.  Patient needs to have a Covid Test 72 Hours prior to this appointment.

## 2020-06-26 NOTE — TELEPHONE ENCOUNTER
Patient called me back and I rescheduled her appointment to see Dr. Hung for 7/8/20 due to needing a Covid Test.  Patient will go to Urgent Care within 72 hours prior

## 2020-07-01 ENCOUNTER — TELEPHONE (OUTPATIENT)
Dept: OTOLARYNGOLOGY | Facility: CLINIC | Age: 35
End: 2020-07-01

## 2020-07-01 NOTE — TELEPHONE ENCOUNTER
Called patient to reschedule COVID test to Thursday, 07/02/2020. Due to the extended time of getting the results back.

## 2020-07-02 ENCOUNTER — TELEPHONE (OUTPATIENT)
Dept: OTOLARYNGOLOGY | Facility: CLINIC | Age: 35
End: 2020-07-02

## 2020-07-02 DIAGNOSIS — J32.9 CHRONIC SINUSITIS, UNSPECIFIED LOCATION: Primary | ICD-10-CM

## 2020-07-02 NOTE — TELEPHONE ENCOUNTER
Tried calling patient regarding covid testing that is due prior to visit with the office. Testing request time right now is 4-5 days prior. Need patient to be tested today due to holiday weekend. No answer, left voicemail for patient to call the office. Pastora placed call to patient yesterday

## 2020-07-03 ENCOUNTER — CLINICAL SUPPORT (OUTPATIENT)
Dept: URGENT CARE | Facility: CLINIC | Age: 35
End: 2020-07-03
Payer: COMMERCIAL

## 2020-07-03 VITALS — OXYGEN SATURATION: 97 % | TEMPERATURE: 99 F | HEART RATE: 97 BPM

## 2020-07-03 DIAGNOSIS — J32.9 CHRONIC SINUSITIS, UNSPECIFIED LOCATION: ICD-10-CM

## 2020-07-03 PROCEDURE — U0003 INFECTIOUS AGENT DETECTION BY NUCLEIC ACID (DNA OR RNA); SEVERE ACUTE RESPIRATORY SYNDROME CORONAVIRUS 2 (SARS-COV-2) (CORONAVIRUS DISEASE [COVID-19]), AMPLIFIED PROBE TECHNIQUE, MAKING USE OF HIGH THROUGHPUT TECHNOLOGIES AS DESCRIBED BY CMS-2020-01-R: HCPCS

## 2020-07-04 LAB — SARS-COV-2 RNA RESP QL NAA+PROBE: NOT DETECTED

## 2020-07-08 ENCOUNTER — OFFICE VISIT (OUTPATIENT)
Dept: OTOLARYNGOLOGY | Facility: CLINIC | Age: 35
End: 2020-07-08
Payer: COMMERCIAL

## 2020-07-08 VITALS
DIASTOLIC BLOOD PRESSURE: 78 MMHG | BODY MASS INDEX: 22.11 KG/M2 | WEIGHT: 120.13 LBS | TEMPERATURE: 98 F | SYSTOLIC BLOOD PRESSURE: 122 MMHG | HEIGHT: 62 IN

## 2020-07-08 DIAGNOSIS — M95.0 NASAL VALVE COLLAPSE: ICD-10-CM

## 2020-07-08 DIAGNOSIS — J34.9 SINUS DISEASE: ICD-10-CM

## 2020-07-08 DIAGNOSIS — J34.2 NASAL SEPTAL DEVIATION: ICD-10-CM

## 2020-07-08 DIAGNOSIS — J34.3 HYPERTROPHY OF BOTH INFERIOR NASAL TURBINATES: ICD-10-CM

## 2020-07-08 DIAGNOSIS — J34.89 REFRACTORY OBSTRUCTION OF NASAL AIRWAY: Primary | ICD-10-CM

## 2020-07-08 DIAGNOSIS — J30.89 NON-SEASONAL ALLERGIC RHINITIS, UNSPECIFIED TRIGGER: ICD-10-CM

## 2020-07-08 DIAGNOSIS — R09.81 NASAL CONGESTION: ICD-10-CM

## 2020-07-08 PROCEDURE — 31231 NASAL ENDOSCOPY DX: CPT | Mod: S$GLB,,, | Performed by: OTOLARYNGOLOGY

## 2020-07-08 PROCEDURE — 31231 NASAL/SINUS ENDOSCOPY: ICD-10-PCS | Mod: S$GLB,,, | Performed by: OTOLARYNGOLOGY

## 2020-07-08 PROCEDURE — 3008F PR BODY MASS INDEX (BMI) DOCUMENTED: ICD-10-PCS | Mod: CPTII,S$GLB,, | Performed by: OTOLARYNGOLOGY

## 2020-07-08 PROCEDURE — 99204 PR OFFICE/OUTPT VISIT, NEW, LEVL IV, 45-59 MIN: ICD-10-PCS | Mod: 25,S$GLB,, | Performed by: OTOLARYNGOLOGY

## 2020-07-08 PROCEDURE — 3008F BODY MASS INDEX DOCD: CPT | Mod: CPTII,S$GLB,, | Performed by: OTOLARYNGOLOGY

## 2020-07-08 PROCEDURE — 99204 OFFICE O/P NEW MOD 45 MIN: CPT | Mod: 25,S$GLB,, | Performed by: OTOLARYNGOLOGY

## 2020-07-08 RX ORDER — AZELASTINE 1 MG/ML
1 SPRAY, METERED NASAL 2 TIMES DAILY
Qty: 30 ML | Refills: 2 | Status: SHIPPED | OUTPATIENT
Start: 2020-07-08 | End: 2021-06-07

## 2020-07-08 RX ORDER — FLUTICASONE PROPIONATE 50 MCG
2 SPRAY, SUSPENSION (ML) NASAL DAILY
Qty: 16 G | Refills: 2 | Status: SHIPPED | OUTPATIENT
Start: 2020-07-08 | End: 2020-10-06

## 2020-07-08 NOTE — PROGRESS NOTES
Subjective:      Greg Borja is a 34 y.o. female who was referred to me by Dr. Swapna Quinonez in consultation for sinusitis. She reports symptoms of constant nasal congestion and sinus and nasal pressure. Current sinonasal medications include claritin and using a humidifer with essential oils and VapoRub on her chest to help with decongestion. The last course of antibiotics was a long time ago related to an abscess on her nose with cellulitis and facial swelling, which since resolved and was a few years ago with no similar issues since. No significant history of recurrent sinusitis requiring oral antibiotics or oral steroids. Rarely has some color to nasal discharge with greenish or blood streaking, no significant epistaxis, mucous usually more clear.  She does not regularly use nasal decongestant sprays. She has tried claritin and allegra and many OTC medications including Tylenol sinus and other medications aimed at sinus and nasal symptoms. Reports that none of the medications seem to be working anymore. Has not tried saline irrigations, previously used nasal saline spray. Has tried flonase before but didn't tolerate because drained in her throat and made her nauseated.    She does not recall previously having allergy testing. She notices that dust bothers her and states she is highly allergic to dust.     She denies a history of asthma.    She denies a history of reflux symptoms.  She has not previously had an EGD.    She denies a diagnosis of obstructive sleep apnea.     She has not had sinonasal surgery.  Has only had tonsillectomy and adenoidectomy as child for recurrent strep.    She does not recall a prior history of nasal trauma.    QOL assessment deferred as currently not being collected due to Covid-19 transmission reduction protocols.    Past Medical History  She has a past medical history of Diabetes mellitus type I and Diabetes mellitus, type 2.    Past Surgical History  She has a past surgical  "history that includes  section and Tonsillectomy.    Family History  Her family history includes Diabetes in her maternal aunt and maternal uncle.    Social History  She reports that she quit smoking about 11 years ago. Her smoking use included cigarettes. She quit after 2.00 years of use. She does not have any smokeless tobacco history on file. She reports current alcohol use. She reports that she does not use drugs.    Allergies  She is allergic to hydrochlorothiazide.    Medications   She has a current medication list which includes the following prescription(s): mupirocin, mupirocin calcium 2% nasl oint, norethindrone, novolog u-100 insulin aspart, azelastine, fluticasone propionate, jolivette, and pantoprazole.    Review of Systems  Review of Systems   Constitutional: Positive for fatigue.   HENT: Positive for congestion, ear discharge, ear pain, postnasal drip, sinus pressure, sneezing and trouble swallowing. Negative for hoarse voice.    Eyes: Positive for redness and itching.   Respiratory: Positive for cough and shortness of breath.    Cardiovascular: Positive for leg swelling.   Gastrointestinal: Positive for diarrhea and nausea.   Endocrine: Positive for cold intolerance.   Genitourinary: Negative.    Musculoskeletal: Positive for neck stiffness.   Skin: Negative.    Allergic/Immunologic: Positive for environmental allergies.   Neurological: Negative.    Hematological: Negative.    Psychiatric/Behavioral: Negative.      Objective:     /78 (BP Location: Right arm, Patient Position: Sitting, BP Method: Large (Manual))   Temp 97.8 °F (36.6 °C)   Ht 5' 2" (1.575 m)   Wt 54.5 kg (120 lb 2.4 oz)   LMP 2020   BMI 21.98 kg/m²        Constitutional:   Vital signs are normal. She appears well-developed and well-nourished. She does not appear ill. No distress. Normal speech.  No hoarse voice, breathy voice and strained voice.      Head:  Normocephalic and atraumatic. No skin lesions. " Salivary glands normal.  Facial strength is normal.      Ears:    Right Ear: No drainage, swelling or tenderness. Tympanic membrane is not injected, not scarred, not perforated, not erythematous, not retracted and not bulging. No middle ear effusion. No decreased hearing is noted.   Left Ear: No drainage, swelling or tenderness. Tympanic membrane is scarred. Tympanic membrane is not injected, not perforated, not erythematous, not retracted and not bulging.  No middle ear effusion. No decreased hearing is noted.     Nose:  Mucosal edema and septal deviation present. No rhinorrhea, nose lacerations, sinus tenderness or polyps. No epistaxis. Turbinate hypertrophy.  Right sinus exhibits no maxillary sinus tenderness and no frontal sinus tenderness. Left sinus exhibits no maxillary sinus tenderness and no frontal sinus tenderness.     Mouth/Throat  Oropharynx clear and moist without lesions or asymmetry, normal uvula midline and lips, teeth, and gums normal. No oral lesions or trismus. No oropharyngeal exudate, posterior oropharyngeal edema or posterior oropharyngeal erythema.     Neck:  Trachea normal, phonation normal, full range of motion with neck supple and no adenopathy. No edema, no erythema, no stridor and no neck rigidity present.     Pulmonary/Chest:   Effort normal. No stridor.     Psychiatric:   She has a normal mood and affect. Her speech is normal and behavior is normal.     Neurological:   She has neurological normal, alert and oriented. No cranial nerve deficit or sensory deficit. Coordination and gait normal.     Skin:   No abrasions, lacerations, lesions, or rashes.       Procedure    Nasal endoscopy performed. Details of procedure described in separate procedure note.     Images obtained from endoscopy procedure today representing key findings (images also uploaded to media associated with patient's chart):     Left side:                      Right side:                        Data Reviewed    WBC  "(K/uL)   Date Value   08/30/2019 6.27     Eosinophil% (%)   Date Value   08/30/2019 0.3     Eos # (K/uL)   Date Value   08/30/2019 0.0     Platelets (K/uL)   Date Value   08/30/2019 244     Glucose (mg/dL)   Date Value   08/30/2019 75     No results found for: IGE    No sinus imaging available.    Assessment:     1. Refractory obstruction of nasal airway    2. Sinus disease    3. Nasal congestion    4. Hypertrophy of both inferior nasal turbinates    5. Nasal valve collapse    6. Nasal septal deviation    7. Non-seasonal allergic rhinitis, unspecified trigger         Plan:     I had a long discussion with the patient regarding her condition and the further workup and management options.      - I have recommended medical management with a combination of nasal saline irrigations, as well as both inhaled nasal steroid (fluticasone) and antihistamine (azelastine).   - Regarding nasal saline irrigations, specific instructions were provided on using this treatment. Explanation was given regarding performing these irrigations, and also provided as written instructions.   - Fluticasone and azelastine nasal spray was prescribed and specific instructions also given on proper use to maximize nasal delivery of the medication. I clarified that both are intended to be used, and emphasized the importance of daily use to achieve therapeutic effect. We discussed that fluticasone in particular is not intended as an occassional "as needed" treatment of symptoms and should be used daily, and that azelastine should also be used daily though may potentially be discontinued during asymptomatic seasons/periods when no allergy triggers are anticipated, if applicable. Written instructions regarding the use of fluticasone and azelastine nasal spray were also provided.    Discussed potential role for surgical interventions if refractory to adequate medical therapy including topical nasal steroid sprays and saline irrigations. These potential " sinonasal surgical interventions would include inferior turbinate reduction and septoplasty. Discussed that this would address partial obstruction and congestion primarily, but would not be directly anticipated to impact symptoms of post-nasal drip, sinus/facial pressure or pain, or other sinonasal concerns, though could secondarily improve additional symptoms by improving nasal drug and rinse delivery. The likely need for ongoing medical management to treat inflammatory etiologies of symptoms despite possible surgery was also reviewed, emphasizing that surgical procedures would address physical causes of obstruction and congestion and serve as an adjunct rather than replacement of medical management.    Follow up in about 5 weeks (around 8/12/2020).

## 2020-07-08 NOTE — PATIENT INSTRUCTIONS
"Information and instructions from your visit with me today:    · Start using fluticasone nasal spray:    This medication is the same as the Flonase brand nasal spray. Use this medication as instructed on the prescription, 2 sprays on each side of your nose once daily. You need to use this medication every day regardless of symptoms, as it takes time to work and get the benefits. It does not work on an "as needed" basis like taking a decongestant. Place the tip of the medication bottle in your nose and aim slightly up and out on each side to get medication high and deep into your nose and sinuses, and not have it all deposit in the very front of your nose. You can imagine aiming towards the back of your eyeball on each side for this, as opposed to straight back to the center of your nose and head.     · Start using azelastine nasal spray:    This medication is an antihistamine used to treat nasal symptoms of allergy, which works specifically in the nose unlike antihistamine pills which have more of an effect on the while body. Use this medication as instructed on the prescription, 1 spray on each side of your nose twice daily. You need to use this medication every day to prevent symptoms. If your symptoms only occur in a particular season, then the medication can be used seasonally instead of year long. Place the tip of the medication bottle in your nose and aim slightly up and out on each side to get medication high and deep into your nose and sinuses, imagine aiming towards the back of your eyeball on each side for this, just like the fluticasone nasal spray.    · Start nasal irrigations with saline solution:    SALINE SINUS RINSE ([employer] brand): You should do a full bottle, half on one side of your nose and half on the other, 1-2 times per day (or more if able to, you cannot do this too much). Follow the instructions on the box: mix the salt packet with clean water (bottle, previously boiled, distilled, etc -- " not tap water) to the line on the bottle to make the irrigation.      · Do not use nasal decongestant sprays such as Afrin or similar products.    It was nice meeting you today, and I look forward to helping you feel better soon. Please don't hesitate to call if you have any other questions or concerns, or if I can be of any assistance in the meantime.       [facility]udioN[facility]    Phone  [phone]    Fax      [phone]        Ford Hung MD  Rhinology, Sinus, and Skull Base Surgery  Department of Otorhinolaryngology

## 2020-07-08 NOTE — LETTER
July 26, 2020      Swapna Quinonez MD  [address] [address]  [address]  [place] [address]           [facility]  [address]   [place] [address]  Phone: [phone]          Patient: Greg Borja   MR Number: [id number]   YOB: 1985   Date of Visit: 7/8/2020       Dear Dr. Swapna Quinonez:    Thank you for referring Greg Borja to me for evaluation. Attached you will find relevant portions of my assessment and plan of care.    If you have questions, please do not hesitate to call me. I look forward to following Greg Borja along with you.    Sincerely,    Ford Hung MD    Enclosure  CC:  No Recipients    If you would like to receive this communication electronically, please contact [email or web address] or [phone] to request more information on [employer] Link access.    For providers and/or their staff who would like to refer a patient to [facility], please contact us through our one-stop-shop provider referral line, [facility], at [phone].    If you feel you have received this communication in error or would no longer like to receive these types of communications, please e-mail [email or web address]

## 2020-07-27 NOTE — PROCEDURES
Nasal/sinus endoscopy    Date/Time: 7/8/2020 3:00 PM  Performed by: Ford Hung MD  Authorized by: Ford Hung MD     Consent Done?:  Yes (Verbal)  Anesthesia:     Local anesthetic:  4% Xylocaine spray with Hasmukh-Synephrine    Patient tolerance:  Patient tolerated the procedure well with no immediate complications  Nose:     Procedure Performed:  Nasal Endoscopy  External:      No external nasal deformity  Intranasal:      Mucosa no polyps     Mucosa ulcers not present     No mucosa lesions present     Enlarged turbinates     Septum gross deformity  Nasopharynx:      No mucosa lesions     Adenoids not present     Posterior choanae patent     Eustachian tube patent        The endoscope was utilized to evaluate the bilateral sinonasal cavities, mucosa, sinus ostia and turbinates. Verbal consent was obtained after describing the indication for this diagnostic procedure and potential risks. Topical anesthetic and decongestant was applied in the bilateral nares. The scope was placed in the patient's left anterior nares and advanced through the nasal cavity carefully examining structures including the nasal septum, nasal turbinates, osteomeatal complex, olfactory recess, sinus ostia, as well as the nasopharynx and eustachian tube orifice at the torus tubarius. The scope was then placed in the patient's right anterior nares and an identical diagnostic evaluation performed on this contralateral side. The patient tolerated the procedure well. Findings were explained to the patient with all questions answered.     Descriptions and representative images of key findings from this diagnostic endoscopy procedure can be found in the associated clinic note of the same date of service. All uploaded images obtained during this exam may also be found in the media section of the patient's chart.      Summary of key findings:   + septal deviation present  + inferior turbinate hypertrophy present  - no purulent or abnormal  sinonasal discharge  - no nasal polyps or masses present  - no septal perforation  - nasopharynx symmetric and torus without eustachian tube obstruction  - no obstructive adenoid tissue present  - no active epistaxis  No additional concerning findings on nasal endoscopy      Ford Hung MD    Rhinology, Allergy, and Sinus-Skull Base Surgery    Department of Otorhinolaryngology    [facility]    Phone  [phone]    Fax      [phone]

## 2020-08-03 ENCOUNTER — OFFICE VISIT (OUTPATIENT)
Dept: ENDOCRINOLOGY | Facility: CLINIC | Age: 35
End: 2020-08-03
Payer: COMMERCIAL

## 2020-08-03 VITALS
SYSTOLIC BLOOD PRESSURE: 122 MMHG | BODY MASS INDEX: 21.89 KG/M2 | TEMPERATURE: 99 F | HEART RATE: 86 BPM | RESPIRATION RATE: 18 BRPM | HEIGHT: 62 IN | OXYGEN SATURATION: 99 % | DIASTOLIC BLOOD PRESSURE: 76 MMHG | WEIGHT: 118.94 LBS

## 2020-08-03 DIAGNOSIS — Z96.41 INSULIN PUMP STATUS: ICD-10-CM

## 2020-08-03 DIAGNOSIS — E10.65 TYPE 1 DIABETES MELLITUS WITH HYPERGLYCEMIA: Primary | ICD-10-CM

## 2020-08-03 PROCEDURE — 3051F PR MOST RECENT HEMOGLOBIN A1C LEVEL 7.0 - < 8.0%: ICD-10-PCS | Mod: CPTII,S$GLB,, | Performed by: INTERNAL MEDICINE

## 2020-08-03 PROCEDURE — 3008F PR BODY MASS INDEX (BMI) DOCUMENTED: ICD-10-PCS | Mod: CPTII,S$GLB,, | Performed by: INTERNAL MEDICINE

## 2020-08-03 PROCEDURE — 99214 OFFICE O/P EST MOD 30 MIN: CPT | Mod: S$GLB,,, | Performed by: INTERNAL MEDICINE

## 2020-08-03 PROCEDURE — 99214 PR OFFICE/OUTPT VISIT, EST, LEVL IV, 30-39 MIN: ICD-10-PCS | Mod: S$GLB,,, | Performed by: INTERNAL MEDICINE

## 2020-08-03 PROCEDURE — 3051F HG A1C>EQUAL 7.0%<8.0%: CPT | Mod: CPTII,S$GLB,, | Performed by: INTERNAL MEDICINE

## 2020-08-03 PROCEDURE — 99999 PR PBB SHADOW E&M-EST. PATIENT-LVL IV: ICD-10-PCS | Mod: PBBFAC,,, | Performed by: INTERNAL MEDICINE

## 2020-08-03 PROCEDURE — 99999 PR PBB SHADOW E&M-EST. PATIENT-LVL IV: CPT | Mod: PBBFAC,,, | Performed by: INTERNAL MEDICINE

## 2020-08-03 PROCEDURE — 3008F BODY MASS INDEX DOCD: CPT | Mod: CPTII,S$GLB,, | Performed by: INTERNAL MEDICINE

## 2020-08-03 RX ORDER — INSULIN ASPART 100 [IU]/ML
INJECTION, SOLUTION INTRAVENOUS; SUBCUTANEOUS
Qty: 60 ML | Refills: 11 | Status: SHIPPED | OUTPATIENT
Start: 2020-08-03 | End: 2021-09-20

## 2020-08-03 RX ORDER — GLUCAGON HYDROCHLORIDE 1 MG
1 KIT INJECTION
Qty: 1 EACH | Refills: 2 | Status: SHIPPED | OUTPATIENT
Start: 2020-08-03 | End: 2023-06-22

## 2020-08-03 RX ORDER — FLASH GLUCOSE SENSOR
2 KIT MISCELLANEOUS
Qty: 2 KIT | Refills: 11 | Status: SHIPPED | OUTPATIENT
Start: 2020-08-03 | End: 2021-08-11

## 2020-08-03 NOTE — ASSESSMENT & PLAN NOTE
Reviewed goals of therapy are to get the best control we can without hypoglycemia. The major issue is missed or late boluses, which we discussed.     I put a sample Freestyle Barbi on today and she downloaded the Bright. She is on an insulin pump and monitoring blood sugars at a minimum of 4 times per day. She has the capability to use the CGM from a technology standpoint. Rx sent to pharmacy.    No pump changes today. When she boluses appropriately, her glucose actually doesn't look bad. Once she is doing appropriate bolusing, we will be able to decide where adjustments need to be made.     Reviewed patient's current insulin regimen. Clarified proper insulin dose and timing in relation to meals, etc. Advised to bolus for all carbs, and always check BG prior to meals.    Advised frequent self blood glucose monitoring. Patient encouraged to document glucose results and bring them to every clinic visit.    Hypoglycemia precautions discussed.     Close adherence to lifestyle changes recommended.      Eyes: UTD  Feet: UTD  Kidneys: Microalbumin ordered  HbA1c: Check now  Lipids: check now   Thyroid: Check TSH  Celiac: had celiac testing done at [facility] within the past few years - negative.  Pregnancy: No plans for pregnancy, but she is aware of the glycemic goals being very stringent and that we would want her A1c below 6.0% before attempting pregnancy.  Glucagon: rx sent

## 2020-08-03 NOTE — PROGRESS NOTES
Subjective:      Chief Complaint: Type 1 diabetes mellitus    She is new to me and to endocrinology clinic    HPI: Greg Borja is a 34 y.o. female who is here for an initial evaluation for type 1 diabetes mellitus.    With regards to the diabetes:  Current symptoms/problems include hyperglycemia and hypoglycemia  and have been worsening recently. She's been under a lot of stress with work and COVID and admits to difficulty monitoring sugars adequately and missing some boluses of insulin. She had one recent event where she gave multiple boluses of insulin for hyperglycemia, but her sugar continued to increase. She changed out her pod and it improved, suggesting mechanical failure.    The patient was initially diagnosed with Type 1 diabetes mellitus: Age 11    Known diabetic complications: none  Cardiovascular risk factors: Diabetes  Current diabetic medications include:   Omnipod with U-100 Novolog: TDD 34.8 units/day  Insulin pump downloaded and reviewed. See media tab.    Settings:  Basal rate:   12A: 0.6  5A:   1.3  8A:   0.7  3P:   1.2  6P:   0.85  Bolus dose/Carb ratio: 8.0  ISF for Correction Dose: 40  IAT: 2 hours  BG target      Pump type:    Infusion set type: Omnipod  Change infusion set: Every 3 days  Change insertion site: with change of infusion set.   Location of insertion site: Abdomen, hips    Prior visit with dietician: has seen DM educator at [facility]    Current monitoring regimen: home blood tests - 4 times daily  Home blood sugar records: See below. Reviewed pump download. A few days with no boluses recorded.  Any episodes of hypoglycemia? yes - but only rarely    BP Readings from Last 3 Encounters:   08/03/20 122/76   07/08/20 122/78   06/10/20 128/80      Use of ACE/ARB: No, Not Indicated        Checked Weight: Yes; BM 21.77          Wt Readings from Last 10 Encounters:   08/03/20 53.9 kg (118 lb 15 oz)   07/08/20 54.5 kg (120 lb 2.4 oz)   06/10/20 53.2 kg (117 lb 4.6 oz)   08/30/19 54 kg  (119 lb 0.8 oz)   10/26/17 51.6 kg (113 lb 12.1 oz)   05/22/15 50.1 kg (110 lb 8 oz)             Diabetes Management Status    Statin: Not taking  ACE/ARB: Not taking    Screening or Prevention Patient's value Goal Complete/Controlled?   HgA1C Testing and Control   Lab Results   Component Value Date    HGBA1C 7.5 (H) 08/03/2020      Annually/Less than 8% Yes   Lipid profile Most Recent Lipid Panel Health Maintenance Topic Completion: Not Found Annually No   LDL control Lab Results   Component Value Date    LDLCALC 92.8 08/03/2020    Annually/Less than 100 mg/dl  No   Nephropathy screening No results found for: LABMICR  No results found for: PROTEINUA Annually No   Blood pressure BP Readings from Last 1 Encounters:   08/03/20 122/76    Less than 140/90 Yes   Dilated retinal exam : 08/26/2019 Annually Yes   Foot exam   : 08/30/2019 Annually Yes     No plans for pregnancy at the moment.    Reviewed past medical, family, social history and updated as appropriate.    Review of Systems   Constitutional: Negative for unexpected weight change.   Eyes: Negative for visual disturbance.   Respiratory: Negative for shortness of breath.    Cardiovascular: Negative for chest pain.   Gastrointestinal: Negative for abdominal pain.   Genitourinary: Negative for urgency.   Musculoskeletal: Negative for arthralgias.   Skin: Negative for wound.   Neurological: Negative for headaches.   Hematological: Does not bruise/bleed easily.   Psychiatric/Behavioral: Negative for sleep disturbance.     Objective:     Vitals:    08/03/20 1011   BP: 122/76   Pulse: 86   Resp: 18   Temp: 98.5 °F (36.9 °C)     BP Readings from Last 5 Encounters:   08/03/20 122/76   07/08/20 122/78   06/10/20 128/80   08/30/19 116/70   10/26/17 116/70     Physical Exam  Vitals signs and nursing note reviewed.   Constitutional:       Appearance: She is well-developed.   HENT:      Right Ear: External ear normal.      Left Ear: External ear normal.      Nose: Nose  normal.   Eyes:      General:         Right eye: No discharge.         Left eye: No discharge.   Neck:      Thyroid: No thyromegaly.      Trachea: No tracheal deviation.   Cardiovascular:      Rate and Rhythm: Normal rate.      Pulses:           Dorsalis pedis pulses are 2+ on the right side and 2+ on the left side.        Posterior tibial pulses are 2+ on the right side and 2+ on the left side.      Heart sounds: No murmur.   Pulmonary:      Effort: Pulmonary effort is normal.      Breath sounds: Normal breath sounds.   Abdominal:      Palpations: Abdomen is soft. There is no mass.      Tenderness: There is no abdominal tenderness.   Musculoskeletal:      Right foot: No deformity.      Left foot: No deformity.      Comments: No digital clubbing or extremity cyanosis  R ankle swelling - chronic.  Deferred foot exam as done by outside physician recently.   Feet:      Right foot:      Protective Sensation: 7 sites tested. 7 sites sensed.      Skin integrity: No ulcer, blister, skin breakdown, erythema, warmth, callus or dry skin.      Left foot:      Protective Sensation: 7 sites tested. 7 sites sensed.      Skin integrity: No ulcer, blister, skin breakdown, erythema, warmth, callus or dry skin.   Skin:     Findings: No rash.      Comments: Some abdominal lipohypertrophy noted.   Omnipod in RLQ - clean and intact.   Neurological:      Mental Status: She is alert and oriented to person, place, and time.      Coordination: Coordination normal.   Psychiatric:         Behavior: Behavior normal.      Comments: Alert and oriented to person, place, and situation.         Wt Readings from Last 10 Encounters:   08/03/20 1011 53.9 kg (118 lb 15 oz)   07/08/20 1607 54.5 kg (120 lb 2.4 oz)   06/10/20 1010 53.2 kg (117 lb 4.6 oz)   08/30/19 1123 54 kg (119 lb 0.8 oz)   10/26/17 0832 51.6 kg (113 lb 12.1 oz)   05/22/15 1608 50.1 kg (110 lb 8 oz)       Lab Results   Component Value Date    HGBA1C 7.5 (H) 08/03/2020    HGBA1C 7.7 (H)  08/30/2019     Lab Results   Component Value Date    CHOL 173 08/03/2020    HDL 61 08/03/2020    LDLCALC 92.8 08/03/2020    TRIG 96 08/03/2020    CHOLHDL 35.3 08/03/2020     Lab Results   Component Value Date     08/03/2020    K 4.5 08/03/2020     08/03/2020    CO2 28 08/03/2020     (H) 08/03/2020    BUN 10 08/03/2020    CREATININE 0.8 08/03/2020    CALCIUM 8.8 08/03/2020    PROT 7.2 08/03/2020    ALBUMIN 3.9 08/03/2020    BILITOT 0.5 08/03/2020    ALKPHOS 84 08/03/2020    AST 15 08/03/2020    ALT 11 08/03/2020    ANIONGAP 8 08/03/2020    ESTGFRAFRICA >60.0 08/03/2020    EGFRNONAA >60.0 08/03/2020    TSH 0.981 08/03/2020      No results found for: MICALBCREAT    Assessment/Plan:     Type 1 diabetes mellitus with hyperglycemia  Reviewed goals of therapy are to get the best control we can without hypoglycemia. The major issue is missed or late boluses, which we discussed.     I put a sample Freestyle Barbi on today and she downloaded the Bright. She is on an insulin pump and monitoring blood sugars at a minimum of 4 times per day. She has the capability to use the CGM from a technology standpoint. Rx sent to pharmacy.    No pump changes today. When she boluses appropriately, her glucose actually doesn't look bad. Once she is doing appropriate bolusing, we will be able to decide where adjustments need to be made.     Reviewed patient's current insulin regimen. Clarified proper insulin dose and timing in relation to meals, etc. Advised to bolus for all carbs, and always check BG prior to meals.    Advised frequent self blood glucose monitoring. Patient encouraged to document glucose results and bring them to every clinic visit.    Hypoglycemia precautions discussed.     Close adherence to lifestyle changes recommended.      Eyes: UTD  Feet: UTD  Kidneys: Microalbumin ordered  HbA1c: Check now  Lipids: check now   Thyroid: Check TSH  Celiac: had celiac testing done at [facility] within the past few years -  negative.  Pregnancy: No plans for pregnancy, but she is aware of the glycemic goals being very stringent and that we would want her A1c below 6.0% before attempting pregnancy.  Glucagon: rx sent    Insulin pump status  See above.      RTC in 6 weeks with Barbi data to decide on pump adjustments.

## 2020-08-03 NOTE — PATIENT INSTRUCTIONS
Here are the instruction on mert to link your Freestyle Barbi account to the clinic so we can view your blood sugar numbers.    Our practice ID number is: [id number]    If you have trouble setting it up. Please let me know.

## 2020-08-14 DIAGNOSIS — Z11.59 NEED FOR HEPATITIS C SCREENING TEST: ICD-10-CM

## 2020-09-10 DIAGNOSIS — E10.65 TYPE 1 DIABETES MELLITUS WITH HYPERGLYCEMIA: ICD-10-CM

## 2020-09-10 DIAGNOSIS — E10.65 TYPE 1 DIABETES MELLITUS WITH HYPERGLYCEMIA: Primary | ICD-10-CM

## 2020-09-23 ENCOUNTER — OFFICE VISIT (OUTPATIENT)
Dept: ENDOCRINOLOGY | Facility: CLINIC | Age: 35
End: 2020-09-23
Payer: COMMERCIAL

## 2020-09-23 VITALS
OXYGEN SATURATION: 99 % | SYSTOLIC BLOOD PRESSURE: 110 MMHG | WEIGHT: 118.25 LBS | RESPIRATION RATE: 18 BRPM | HEIGHT: 62 IN | DIASTOLIC BLOOD PRESSURE: 72 MMHG | HEART RATE: 84 BPM | BODY MASS INDEX: 21.76 KG/M2 | TEMPERATURE: 99 F

## 2020-09-23 DIAGNOSIS — Z96.41 INSULIN PUMP STATUS: ICD-10-CM

## 2020-09-23 DIAGNOSIS — E10.65 TYPE 1 DIABETES MELLITUS WITH HYPERGLYCEMIA: Primary | ICD-10-CM

## 2020-09-23 PROCEDURE — 3008F BODY MASS INDEX DOCD: CPT | Mod: CPTII,S$GLB,, | Performed by: INTERNAL MEDICINE

## 2020-09-23 PROCEDURE — 3051F HG A1C>EQUAL 7.0%<8.0%: CPT | Mod: CPTII,S$GLB,, | Performed by: INTERNAL MEDICINE

## 2020-09-23 PROCEDURE — 95251 CONT GLUC MNTR ANALYSIS I&R: CPT | Mod: S$GLB,,, | Performed by: INTERNAL MEDICINE

## 2020-09-23 PROCEDURE — 3008F PR BODY MASS INDEX (BMI) DOCUMENTED: ICD-10-PCS | Mod: CPTII,S$GLB,, | Performed by: INTERNAL MEDICINE

## 2020-09-23 PROCEDURE — 99214 PR OFFICE/OUTPT VISIT, EST, LEVL IV, 30-39 MIN: ICD-10-PCS | Mod: 25,S$GLB,, | Performed by: INTERNAL MEDICINE

## 2020-09-23 PROCEDURE — 99999 PR PBB SHADOW E&M-EST. PATIENT-LVL IV: ICD-10-PCS | Mod: PBBFAC,,, | Performed by: INTERNAL MEDICINE

## 2020-09-23 PROCEDURE — 95251 PR GLUCOSE MONITOR, 72 HOUR, PHYS INTERP: ICD-10-PCS | Mod: S$GLB,,, | Performed by: INTERNAL MEDICINE

## 2020-09-23 PROCEDURE — 99999 PR PBB SHADOW E&M-EST. PATIENT-LVL IV: CPT | Mod: PBBFAC,,, | Performed by: INTERNAL MEDICINE

## 2020-09-23 PROCEDURE — 3051F PR MOST RECENT HEMOGLOBIN A1C LEVEL 7.0 - < 8.0%: ICD-10-PCS | Mod: CPTII,S$GLB,, | Performed by: INTERNAL MEDICINE

## 2020-09-23 PROCEDURE — 99214 OFFICE O/P EST MOD 30 MIN: CPT | Mod: 25,S$GLB,, | Performed by: INTERNAL MEDICINE

## 2020-09-23 NOTE — PROGRESS NOTES
Subjective:      Chief Complaint: Type 1 diabetes mellitus    HPI: Greg Borja is a 34 y.o. female who is here for follow-up evaluation for type 1 diabetes mellitus.    Last seen by me 8/3/2020.    With regards to the diabetes:  Current symptoms/problems include hyperglycemia and hypoglycemia  and have been worsening recently. She's been under a lot of stress with work and COVID and admits to difficulty monitoring sugars adequately and missing some boluses of insulin.  She used the Freestyle Barbi for about two weeks, but it was initially requiring authorizations or insurance, which took some time.  She says it is now been improved, and she plans to start using it now.    The patient was initially diagnosed with Type 1 diabetes mellitus: Age 11    Known diabetic complications: none  Cardiovascular risk factors: Diabetes  Current diabetic medications include:   Omnipod with U-100 Novolog: TDD 34.8 units/day  Insulin pump downloaded and reviewed. See media tab.    Settings:  Basal rate:   12A: 0.6  5A:   1.3  8A:   0.7  3P:   1.2  6P:   0.85     Bolus dose/Carb ratio: 8.0    ISF for Correction Dose: 40    IAT: 2 hours    BG target      Insulin pump was downloaded and reviewed.  Reviewed two weeks have data from August 4 through August 17th on [employer].  Patterns:  Daytime highs.  Several missed boluses. A few days with no boluses recorded.  Any episodes of hypoglycemia? yes - usually late at night.  Early morning rise in blood glucose starting at approximately 03:00- 04:00.                Pump type:    Infusion set type: Omnipod  Change infusion set: Every 3 days  Change insertion site: with change of infusion set.   Location of insertion site: Abdomen, hips    Prior visit with dietician: has seen DM educator at [facility]    Current monitoring regimen: home blood tests - 4 times daily ; she ran out of strips recently, so was rationing these.  She will be starting on the freestyle Barbi long-term starting  tomorrow.      BP Readings from Last 3 Encounters:   09/23/20 110/72   08/03/20 122/76   07/08/20 122/78      Use of ACE/ARB: No, Not Indicated        Checked Weight: Yes; BM 21.77          Wt Readings from Last 10 Encounters:   09/23/20 53.7 kg (118 lb 4.4 oz)   08/03/20 53.9 kg (118 lb 15 oz)   07/08/20 54.5 kg (120 lb 2.4 oz)   06/10/20 53.2 kg (117 lb 4.6 oz)   08/30/19 54 kg (119 lb 0.8 oz)   10/26/17 51.6 kg (113 lb 12.1 oz)   05/22/15 50.1 kg (110 lb 8 oz)     Diabetes Management Status    Statin: Not taking  ACE/ARB: Not taking    Screening or Prevention Patient's value Goal Complete/Controlled?   HgA1C Testing and Control   Lab Results   Component Value Date    HGBA1C 7.5 (H) 08/03/2020      Annually/Less than 8% Yes   Lipid profile : 08/03/2020 Annually Yes   LDL control Lab Results   Component Value Date    LDLCALC 92.8 08/03/2020    Annually/Less than 100 mg/dl  Yes   Nephropathy screening No results found for: LABMICR  No results found for: PROTEINUA Annually No   Blood pressure BP Readings from Last 1 Encounters:   09/23/20 110/72    Less than 140/90 Yes   Dilated retinal exam : 08/01/2020 Annually No   Foot exam   : 08/03/2020 Annually Yes     No plans for pregnancy at the moment.    Reviewed past medical, family, social history and updated as appropriate.    Review of Systems   Constitutional: Negative for unexpected weight change.   Eyes: Negative for visual disturbance.   Respiratory: Negative for shortness of breath.    Cardiovascular: Negative for chest pain.   Gastrointestinal: Negative for abdominal pain.   Genitourinary: Negative for urgency.   Musculoskeletal: Negative for arthralgias.   Skin: Negative for wound.   Neurological: Negative for headaches.   Hematological: Does not bruise/bleed easily.   Psychiatric/Behavioral: Negative for sleep disturbance.     Objective:     Vitals:    09/23/20 0834   BP: 110/72   Pulse: 84   Resp: 18   Temp: 98.6 °F (37 °C)     BP Readings from Last 5  Encounters:   09/23/20 110/72   08/03/20 122/76   07/08/20 122/78   06/10/20 128/80   08/30/19 116/70     Physical Exam  Vitals signs and nursing note reviewed.   Constitutional:       General: She is not in acute distress.     Appearance: She is well-developed.   HENT:      Head: Normocephalic and atraumatic.   Eyes:      General:         Right eye: No discharge.         Left eye: No discharge.      Conjunctiva/sclera: Conjunctivae normal.   Neck:      Thyroid: No thyromegaly.      Trachea: No tracheal deviation.   Cardiovascular:      Rate and Rhythm: Normal rate.   Pulmonary:      Effort: Pulmonary effort is normal. No respiratory distress.   Musculoskeletal:      Comments: No digital clubbing or extremity cyanosis   Neurological:      Mental Status: She is alert and oriented to person, place, and time.      Coordination: Coordination normal.   Psychiatric:         Behavior: Behavior normal.         Wt Readings from Last 10 Encounters:   09/23/20 0834 53.7 kg (118 lb 4.4 oz)   08/03/20 1011 53.9 kg (118 lb 15 oz)   07/08/20 1607 54.5 kg (120 lb 2.4 oz)   06/10/20 1010 53.2 kg (117 lb 4.6 oz)   08/30/19 1123 54 kg (119 lb 0.8 oz)   10/26/17 0832 51.6 kg (113 lb 12.1 oz)   05/22/15 1608 50.1 kg (110 lb 8 oz)       Lab Results   Component Value Date    HGBA1C 7.5 (H) 08/03/2020    HGBA1C 7.7 (H) 08/30/2019     Lab Results   Component Value Date    CHOL 173 08/03/2020    HDL 61 08/03/2020    LDLCALC 92.8 08/03/2020    TRIG 96 08/03/2020    CHOLHDL 35.3 08/03/2020     Lab Results   Component Value Date     08/03/2020    K 4.5 08/03/2020     08/03/2020    CO2 28 08/03/2020     (H) 08/03/2020    BUN 10 08/03/2020    CREATININE 0.8 08/03/2020    CALCIUM 8.8 08/03/2020    PROT 7.2 08/03/2020    ALBUMIN 3.9 08/03/2020    BILITOT 0.5 08/03/2020    ALKPHOS 84 08/03/2020    AST 15 08/03/2020    ALT 11 08/03/2020    ANIONGAP 8 08/03/2020    ESTGFRAFRICA >60.0 08/03/2020    EGFRNONAA >60.0 08/03/2020    TSH  0.981 08/03/2020      No results found for: MICALBCREAT    Assessment/Plan:     Type 1 diabetes mellitus with hyperglycemia  Reviewed goals of therapy are to get the best control we can without hypoglycemia. The major issue is missed or late boluses, which we discussed.     She was started on Freestyle Barbi starting tomorrow. She is on an insulin pump and monitoring blood sugars at a minimum of 4 times per day. She has the capability to use the CGM from a technology standpoint.     Difficult to assess adequacy of bolus dosing due to frequent missed boluses.  Discussed that she needs to bolus for all meals.     Basal adjustments:  12A:    0.6  330A:   1.3 - shifted time of increased basal to earlier in the day to account for early morning arise.  8A:   0.7  3P:   1.2  6P:   0.75 - decreased to minimize late night hypoglycemia     Reviewed patient's current insulin regimen. Clarified proper insulin dose and timing in relation to meals, etc. Advised to bolus for all carbs, and always check BG prior to meals.    Advised frequent self blood glucose monitoring. Patient encouraged to document glucose results and bring them to every clinic visit.    Hypoglycemia precautions discussed.     Close adherence to lifestyle changes recommended.      Eyes: UTD  Feet: UTD  Kidneys: Microalbumin ordered for next visit  HbA1c:  Check in two months.  Lipids:  LDL at goal  Thyroid:  Normal  Celiac: had celiac testing done at [facility] within the past few years - negative.  Pregnancy: No plans for pregnancy, but she is aware of the glycemic goals being very stringent and that we would want her A1c below 6.0% before attempting pregnancy.  Glucagon:  Yes    Insulin pump status  See above.       RTC in eight weeks with Barbi data to decide on further pump adjustments.  Labs before the visit.

## 2020-09-23 NOTE — ASSESSMENT & PLAN NOTE
Reviewed goals of therapy are to get the best control we can without hypoglycemia. The major issue is missed or late boluses, which we discussed.     She was started on Freestyle Barbi starting tomorrow. She is on an insulin pump and monitoring blood sugars at a minimum of 4 times per day. She has the capability to use the CGM from a technology standpoint.     Difficult to assess adequacy of bolus dosing due to frequent missed boluses.  Discussed that she needs to bolus for all meals.     Basal adjustments:  12A:    0.6  330A:   1.3 - shifted time of increased basal to earlier in the day to account for early morning arise.  8A:   0.7  3P:   1.2  6P:   0.75 - decreased to minimize late night hypoglycemia     Reviewed patient's current insulin regimen. Clarified proper insulin dose and timing in relation to meals, etc. Advised to bolus for all carbs, and always check BG prior to meals.    Advised frequent self blood glucose monitoring. Patient encouraged to document glucose results and bring them to every clinic visit.    Hypoglycemia precautions discussed.     Close adherence to lifestyle changes recommended.      Eyes: UTD  Feet: UTD  Kidneys: Microalbumin ordered for next visit  HbA1c:  Check in two months.  Lipids:  LDL at goal  Thyroid:  Normal  Celiac: had celiac testing done at [facility] within the past few years - negative.  Pregnancy: No plans for pregnancy, but she is aware of the glycemic goals being very stringent and that we would want her A1c below 6.0% before attempting pregnancy.  Glucagon:  Yes

## 2020-10-05 ENCOUNTER — PATIENT MESSAGE (OUTPATIENT)
Dept: ADMINISTRATIVE | Facility: HOSPITAL | Age: 35
End: 2020-10-05

## 2021-01-05 ENCOUNTER — PATIENT MESSAGE (OUTPATIENT)
Dept: ADMINISTRATIVE | Facility: HOSPITAL | Age: 36
End: 2021-01-05

## 2021-01-26 ENCOUNTER — PATIENT MESSAGE (OUTPATIENT)
Dept: ENDOCRINOLOGY | Facility: CLINIC | Age: 36
End: 2021-01-26

## 2021-02-05 ENCOUNTER — TELEPHONE (OUTPATIENT)
Dept: ENDOCRINOLOGY | Facility: CLINIC | Age: 36
End: 2021-02-05

## 2021-02-05 ENCOUNTER — PATIENT MESSAGE (OUTPATIENT)
Dept: ENDOCRINOLOGY | Facility: CLINIC | Age: 36
End: 2021-02-05

## 2021-02-05 ENCOUNTER — LAB VISIT (OUTPATIENT)
Dept: LAB | Facility: HOSPITAL | Age: 36
End: 2021-02-05
Attending: INTERNAL MEDICINE
Payer: COMMERCIAL

## 2021-02-05 DIAGNOSIS — Z11.59 NEED FOR HEPATITIS C SCREENING TEST: ICD-10-CM

## 2021-02-05 DIAGNOSIS — E10.65 TYPE 1 DIABETES MELLITUS WITH HYPERGLYCEMIA: ICD-10-CM

## 2021-02-05 LAB
ALBUMIN SERPL BCP-MCNC: 3.9 G/DL (ref 3.5–5.2)
ALP SERPL-CCNC: 72 U/L (ref 55–135)
ALT SERPL W/O P-5'-P-CCNC: 12 U/L (ref 10–44)
ANION GAP SERPL CALC-SCNC: 9 MMOL/L (ref 8–16)
AST SERPL-CCNC: 16 U/L (ref 10–40)
BASOPHILS # BLD AUTO: 0.06 K/UL (ref 0–0.2)
BASOPHILS NFR BLD: 1.3 % (ref 0–1.9)
BILIRUB SERPL-MCNC: 0.8 MG/DL (ref 0.1–1)
BUN SERPL-MCNC: 13 MG/DL (ref 6–20)
CALCIUM SERPL-MCNC: 8.8 MG/DL (ref 8.7–10.5)
CHLORIDE SERPL-SCNC: 106 MMOL/L (ref 95–110)
CHOLEST SERPL-MCNC: 133 MG/DL (ref 120–199)
CHOLEST/HDLC SERPL: 3 {RATIO} (ref 2–5)
CO2 SERPL-SCNC: 27 MMOL/L (ref 23–29)
CREAT SERPL-MCNC: 0.8 MG/DL (ref 0.5–1.4)
DIFFERENTIAL METHOD: NORMAL
EOSINOPHIL # BLD AUTO: 0.1 K/UL (ref 0–0.5)
EOSINOPHIL NFR BLD: 1.1 % (ref 0–8)
ERYTHROCYTE [DISTWIDTH] IN BLOOD BY AUTOMATED COUNT: 11.5 % (ref 11.5–14.5)
EST. GFR  (AFRICAN AMERICAN): >60 ML/MIN/1.73 M^2
EST. GFR  (NON AFRICAN AMERICAN): >60 ML/MIN/1.73 M^2
ESTIMATED AVG GLUCOSE: 169 MG/DL (ref 68–131)
GLUCOSE SERPL-MCNC: 60 MG/DL (ref 70–110)
HBA1C MFR BLD: 7.5 % (ref 4–5.6)
HCT VFR BLD AUTO: 44.4 % (ref 37–48.5)
HCV AB SERPL QL IA: NEGATIVE
HDLC SERPL-MCNC: 45 MG/DL (ref 40–75)
HDLC SERPL: 33.8 % (ref 20–50)
HGB BLD-MCNC: 14.6 G/DL (ref 12–16)
IMM GRANULOCYTES # BLD AUTO: 0.01 K/UL (ref 0–0.04)
IMM GRANULOCYTES NFR BLD AUTO: 0.2 % (ref 0–0.5)
LDLC SERPL CALC-MCNC: 79.6 MG/DL (ref 63–159)
LYMPHOCYTES # BLD AUTO: 1.5 K/UL (ref 1–4.8)
LYMPHOCYTES NFR BLD: 33.9 % (ref 18–48)
MCH RBC QN AUTO: 29.3 PG (ref 27–31)
MCHC RBC AUTO-ENTMCNC: 32.9 G/DL (ref 32–36)
MCV RBC AUTO: 89 FL (ref 82–98)
MONOCYTES # BLD AUTO: 0.5 K/UL (ref 0.3–1)
MONOCYTES NFR BLD: 11.9 % (ref 4–15)
NEUTROPHILS # BLD AUTO: 2.3 K/UL (ref 1.8–7.7)
NEUTROPHILS NFR BLD: 51.6 % (ref 38–73)
NONHDLC SERPL-MCNC: 88 MG/DL
NRBC BLD-RTO: 0 /100 WBC
PLATELET # BLD AUTO: 303 K/UL (ref 150–350)
PMV BLD AUTO: 11 FL (ref 9.2–12.9)
POTASSIUM SERPL-SCNC: 4.3 MMOL/L (ref 3.5–5.1)
PROT SERPL-MCNC: 6.8 G/DL (ref 6–8.4)
RBC # BLD AUTO: 4.98 M/UL (ref 4–5.4)
SODIUM SERPL-SCNC: 142 MMOL/L (ref 136–145)
TRIGL SERPL-MCNC: 42 MG/DL (ref 30–150)
TSH SERPL DL<=0.005 MIU/L-ACNC: 1.36 UIU/ML (ref 0.4–4)
WBC # BLD AUTO: 4.54 K/UL (ref 3.9–12.7)

## 2021-02-05 PROCEDURE — 80061 LIPID PANEL: CPT

## 2021-02-05 PROCEDURE — 86803 HEPATITIS C AB TEST: CPT

## 2021-02-05 PROCEDURE — 85025 COMPLETE CBC W/AUTO DIFF WBC: CPT

## 2021-02-05 PROCEDURE — 80053 COMPREHEN METABOLIC PANEL: CPT

## 2021-02-05 PROCEDURE — 36415 COLL VENOUS BLD VENIPUNCTURE: CPT | Mod: PO

## 2021-02-05 PROCEDURE — 83036 HEMOGLOBIN GLYCOSYLATED A1C: CPT

## 2021-02-05 PROCEDURE — 84443 ASSAY THYROID STIM HORMONE: CPT

## 2021-02-08 ENCOUNTER — OFFICE VISIT (OUTPATIENT)
Dept: ENDOCRINOLOGY | Facility: CLINIC | Age: 36
End: 2021-02-08
Payer: COMMERCIAL

## 2021-02-08 DIAGNOSIS — E04.1 THYROID NODULE: ICD-10-CM

## 2021-02-08 DIAGNOSIS — E07.9 THYROID MASS: ICD-10-CM

## 2021-02-08 DIAGNOSIS — E10.65 TYPE 1 DIABETES MELLITUS WITH HYPERGLYCEMIA: Primary | ICD-10-CM

## 2021-02-08 DIAGNOSIS — Z96.41 INSULIN PUMP STATUS: ICD-10-CM

## 2021-02-08 PROCEDURE — 99214 PR OFFICE/OUTPT VISIT, EST, LEVL IV, 30-39 MIN: ICD-10-PCS | Mod: 95,,, | Performed by: INTERNAL MEDICINE

## 2021-02-08 PROCEDURE — 3051F PR MOST RECENT HEMOGLOBIN A1C LEVEL 7.0 - < 8.0%: ICD-10-PCS | Mod: CPTII,,, | Performed by: INTERNAL MEDICINE

## 2021-02-08 PROCEDURE — 3051F HG A1C>EQUAL 7.0%<8.0%: CPT | Mod: CPTII,,, | Performed by: INTERNAL MEDICINE

## 2021-02-08 PROCEDURE — 99214 OFFICE O/P EST MOD 30 MIN: CPT | Mod: 95,,, | Performed by: INTERNAL MEDICINE

## 2021-03-08 ENCOUNTER — OFFICE VISIT (OUTPATIENT)
Dept: OTOLARYNGOLOGY | Facility: CLINIC | Age: 36
End: 2021-03-08
Payer: COMMERCIAL

## 2021-03-08 VITALS
WEIGHT: 116.19 LBS | SYSTOLIC BLOOD PRESSURE: 131 MMHG | DIASTOLIC BLOOD PRESSURE: 79 MMHG | BODY MASS INDEX: 21.25 KG/M2 | HEART RATE: 108 BPM

## 2021-03-08 DIAGNOSIS — E04.1 THYROID NODULE: Primary | ICD-10-CM

## 2021-03-08 PROCEDURE — 3008F BODY MASS INDEX DOCD: CPT | Mod: CPTII,S$GLB,, | Performed by: OTOLARYNGOLOGY

## 2021-03-08 PROCEDURE — 99213 PR OFFICE/OUTPT VISIT, EST, LEVL III, 20-29 MIN: ICD-10-PCS | Mod: S$GLB,,, | Performed by: OTOLARYNGOLOGY

## 2021-03-08 PROCEDURE — 1126F AMNT PAIN NOTED NONE PRSNT: CPT | Mod: S$GLB,,, | Performed by: OTOLARYNGOLOGY

## 2021-03-08 PROCEDURE — 99999 PR PBB SHADOW E&M-EST. PATIENT-LVL III: CPT | Mod: PBBFAC,,, | Performed by: OTOLARYNGOLOGY

## 2021-03-08 PROCEDURE — 99213 OFFICE O/P EST LOW 20 MIN: CPT | Mod: S$GLB,,, | Performed by: OTOLARYNGOLOGY

## 2021-03-08 PROCEDURE — 99999 PR PBB SHADOW E&M-EST. PATIENT-LVL III: ICD-10-PCS | Mod: PBBFAC,,, | Performed by: OTOLARYNGOLOGY

## 2021-03-08 PROCEDURE — 1126F PR PAIN SEVERITY QUANTIFIED, NO PAIN PRESENT: ICD-10-PCS | Mod: S$GLB,,, | Performed by: OTOLARYNGOLOGY

## 2021-03-08 PROCEDURE — 3008F PR BODY MASS INDEX (BMI) DOCUMENTED: ICD-10-PCS | Mod: CPTII,S$GLB,, | Performed by: OTOLARYNGOLOGY

## 2021-03-25 ENCOUNTER — TELEPHONE (OUTPATIENT)
Dept: ENDOCRINOLOGY | Facility: CLINIC | Age: 36
End: 2021-03-25

## 2021-03-25 ENCOUNTER — HOSPITAL ENCOUNTER (OUTPATIENT)
Dept: ENDOCRINOLOGY | Facility: CLINIC | Age: 36
Discharge: HOME | End: 2021-03-25
Attending: INTERNAL MEDICINE
Payer: COMMERCIAL

## 2021-03-25 DIAGNOSIS — E04.2 MULTIPLE THYROID NODULES: Primary | ICD-10-CM

## 2021-03-25 DIAGNOSIS — E04.1 THYROID NODULE: ICD-10-CM

## 2021-03-25 PROCEDURE — 76536 US SOFT TISSUE HEAD NECK THYROID: ICD-10-PCS | Mod: S$GLB,,, | Performed by: INTERNAL MEDICINE

## 2021-03-25 PROCEDURE — 76536 US EXAM OF HEAD AND NECK: CPT | Mod: S$GLB,,, | Performed by: INTERNAL MEDICINE

## 2021-04-06 ENCOUNTER — PATIENT MESSAGE (OUTPATIENT)
Dept: ADMINISTRATIVE | Facility: HOSPITAL | Age: 36
End: 2021-04-06

## 2021-04-13 ENCOUNTER — PATIENT MESSAGE (OUTPATIENT)
Dept: FAMILY MEDICINE | Facility: CLINIC | Age: 36
End: 2021-04-13

## 2021-04-22 ENCOUNTER — HOSPITAL ENCOUNTER (OUTPATIENT)
Dept: ENDOCRINOLOGY | Facility: CLINIC | Age: 36
Discharge: HOME | End: 2021-04-22
Attending: INTERNAL MEDICINE
Payer: COMMERCIAL

## 2021-04-22 DIAGNOSIS — E04.2 MULTIPLE THYROID NODULES: Primary | ICD-10-CM

## 2021-04-22 PROCEDURE — 88173 CYTOPATH EVAL FNA REPORT: CPT | Mod: 26,,, | Performed by: PATHOLOGY

## 2021-04-22 PROCEDURE — 88173 CYTOPATH EVAL FNA REPORT: CPT | Performed by: PATHOLOGY

## 2021-04-22 PROCEDURE — 88173 PR  INTERPRETATION OF FNA SMEAR: ICD-10-PCS | Mod: 26,59,, | Performed by: PATHOLOGY

## 2021-04-22 PROCEDURE — 10005 FNA BX W/US GDN 1ST LES: CPT | Mod: S$GLB,,, | Performed by: INTERNAL MEDICINE

## 2021-04-22 PROCEDURE — 10005 US FINE NEEDLE ASPIRATION THYROID, FIRST LESION: ICD-10-PCS | Mod: S$GLB,,, | Performed by: INTERNAL MEDICINE

## 2021-04-22 PROCEDURE — 88173 CYTOPATH EVAL FNA REPORT: CPT | Mod: 26,59,, | Performed by: PATHOLOGY

## 2021-04-22 PROCEDURE — 88173 CYTOPATH EVAL FNA REPORT: CPT | Mod: 59 | Performed by: PATHOLOGY

## 2021-04-22 PROCEDURE — 88173 PR  INTERPRETATION OF FNA SMEAR: ICD-10-PCS | Mod: 26,,, | Performed by: PATHOLOGY

## 2021-04-22 PROCEDURE — 10006 FNA BX W/US GDN EA ADDL: CPT | Mod: S$GLB,,, | Performed by: INTERNAL MEDICINE

## 2021-04-22 PROCEDURE — 10006 US FINE NEEDLE ASPIRATION THYROID EA ADDITIONAL LESION: ICD-10-PCS | Mod: S$GLB,,, | Performed by: INTERNAL MEDICINE

## 2021-04-26 LAB
FINAL PATHOLOGIC DIAGNOSIS: ABNORMAL
FINAL PATHOLOGIC DIAGNOSIS: NORMAL
Lab: ABNORMAL
Lab: NORMAL

## 2021-06-07 ENCOUNTER — OFFICE VISIT (OUTPATIENT)
Dept: ENDOCRINOLOGY | Facility: CLINIC | Age: 36
End: 2021-06-07
Payer: COMMERCIAL

## 2021-06-07 VITALS
HEIGHT: 62 IN | DIASTOLIC BLOOD PRESSURE: 70 MMHG | WEIGHT: 110.25 LBS | SYSTOLIC BLOOD PRESSURE: 122 MMHG | BODY MASS INDEX: 20.29 KG/M2

## 2021-06-07 DIAGNOSIS — E10.65 TYPE 1 DIABETES MELLITUS WITH HYPERGLYCEMIA: Primary | ICD-10-CM

## 2021-06-07 DIAGNOSIS — E04.2 MULTIPLE THYROID NODULES: ICD-10-CM

## 2021-06-07 DIAGNOSIS — Z96.41 INSULIN PUMP STATUS: ICD-10-CM

## 2021-06-07 PROBLEM — E04.1 THYROID NODULE: Status: RESOLVED | Noted: 2021-03-08 | Resolved: 2021-06-07

## 2021-06-07 PROCEDURE — 3051F HG A1C>EQUAL 7.0%<8.0%: CPT | Mod: CPTII,S$GLB,, | Performed by: INTERNAL MEDICINE

## 2021-06-07 PROCEDURE — 3008F BODY MASS INDEX DOCD: CPT | Mod: CPTII,S$GLB,, | Performed by: INTERNAL MEDICINE

## 2021-06-07 PROCEDURE — 1126F PR PAIN SEVERITY QUANTIFIED, NO PAIN PRESENT: ICD-10-PCS | Mod: S$GLB,,, | Performed by: INTERNAL MEDICINE

## 2021-06-07 PROCEDURE — 99999 PR PBB SHADOW E&M-EST. PATIENT-LVL III: CPT | Mod: PBBFAC,,, | Performed by: INTERNAL MEDICINE

## 2021-06-07 PROCEDURE — 99214 PR OFFICE/OUTPT VISIT, EST, LEVL IV, 30-39 MIN: ICD-10-PCS | Mod: 25,S$GLB,, | Performed by: INTERNAL MEDICINE

## 2021-06-07 PROCEDURE — 99999 PR PBB SHADOW E&M-EST. PATIENT-LVL III: ICD-10-PCS | Mod: PBBFAC,,, | Performed by: INTERNAL MEDICINE

## 2021-06-07 PROCEDURE — 99214 OFFICE O/P EST MOD 30 MIN: CPT | Mod: 25,S$GLB,, | Performed by: INTERNAL MEDICINE

## 2021-06-07 PROCEDURE — 3008F PR BODY MASS INDEX (BMI) DOCUMENTED: ICD-10-PCS | Mod: CPTII,S$GLB,, | Performed by: INTERNAL MEDICINE

## 2021-06-07 PROCEDURE — 95251 PR GLUCOSE MONITOR, 72 HOUR, PHYS INTERP: ICD-10-PCS | Mod: S$GLB,,, | Performed by: INTERNAL MEDICINE

## 2021-06-07 PROCEDURE — 95251 CONT GLUC MNTR ANALYSIS I&R: CPT | Mod: S$GLB,,, | Performed by: INTERNAL MEDICINE

## 2021-06-07 PROCEDURE — 1126F AMNT PAIN NOTED NONE PRSNT: CPT | Mod: S$GLB,,, | Performed by: INTERNAL MEDICINE

## 2021-06-07 PROCEDURE — 3051F PR MOST RECENT HEMOGLOBIN A1C LEVEL 7.0 - < 8.0%: ICD-10-PCS | Mod: CPTII,S$GLB,, | Performed by: INTERNAL MEDICINE

## 2021-06-09 ENCOUNTER — LAB VISIT (OUTPATIENT)
Dept: LAB | Facility: HOSPITAL | Age: 36
End: 2021-06-09
Attending: INTERNAL MEDICINE
Payer: COMMERCIAL

## 2021-06-09 DIAGNOSIS — E10.65 TYPE 1 DIABETES MELLITUS WITH HYPERGLYCEMIA: ICD-10-CM

## 2021-06-09 LAB
ESTIMATED AVG GLUCOSE: 186 MG/DL (ref 68–131)
HBA1C MFR BLD: 8.1 % (ref 4–5.6)

## 2021-06-09 PROCEDURE — 36415 COLL VENOUS BLD VENIPUNCTURE: CPT | Mod: PO | Performed by: INTERNAL MEDICINE

## 2021-06-09 PROCEDURE — 83036 HEMOGLOBIN GLYCOSYLATED A1C: CPT | Performed by: INTERNAL MEDICINE

## 2021-06-10 ENCOUNTER — PATIENT MESSAGE (OUTPATIENT)
Dept: ENDOCRINOLOGY | Facility: CLINIC | Age: 36
End: 2021-06-10

## 2021-06-14 ENCOUNTER — TELEPHONE (OUTPATIENT)
Dept: ENDOCRINOLOGY | Facility: CLINIC | Age: 36
End: 2021-06-14

## 2021-07-07 ENCOUNTER — PATIENT MESSAGE (OUTPATIENT)
Dept: ADMINISTRATIVE | Facility: HOSPITAL | Age: 36
End: 2021-07-07

## 2021-07-24 RX ORDER — AMOXICILLIN AND CLAVULANATE POTASSIUM 875; 125 MG/1; MG/1
1 TABLET, FILM COATED ORAL EVERY 12 HOURS
Qty: 14 TABLET | Refills: 0 | Status: SHIPPED | OUTPATIENT
Start: 2021-07-24 | End: 2021-07-31

## 2021-08-11 ENCOUNTER — PATIENT MESSAGE (OUTPATIENT)
Dept: ENDOCRINOLOGY | Facility: CLINIC | Age: 36
End: 2021-08-11

## 2021-08-11 DIAGNOSIS — E10.65 TYPE 1 DIABETES MELLITUS WITH HYPERGLYCEMIA: Primary | ICD-10-CM

## 2021-08-11 RX ORDER — DEXTROSE 4 G
TABLET,CHEWABLE ORAL
Qty: 1 EACH | Refills: 0 | Status: SHIPPED | OUTPATIENT
Start: 2021-08-11

## 2021-10-04 ENCOUNTER — PATIENT MESSAGE (OUTPATIENT)
Dept: ADMINISTRATIVE | Facility: HOSPITAL | Age: 36
End: 2021-10-04

## 2021-11-22 DIAGNOSIS — E10.69 TYPE I DIABETES MELLITUS WITH HYPEROSMOLAR COMA: ICD-10-CM

## 2021-11-22 DIAGNOSIS — E10.65 TYPE I DIABETES MELLITUS WITH HYPEROSMOLAR COMA: ICD-10-CM

## 2021-11-22 DIAGNOSIS — E10.65 UNCONTROLLED TYPE 1 DIABETES MELLITUS WITH HYPERGLYCEMIA: Primary | ICD-10-CM

## 2021-11-22 DIAGNOSIS — E87.0 TYPE I DIABETES MELLITUS WITH HYPEROSMOLAR COMA: ICD-10-CM

## 2021-11-22 RX ORDER — INSULIN ASPART 100 [IU]/ML
6 INJECTION, SOLUTION INTRAVENOUS; SUBCUTANEOUS
Qty: 6 ML | Refills: 11 | Status: SHIPPED | OUTPATIENT
Start: 2021-11-22 | End: 2022-11-10 | Stop reason: SDUPTHER

## 2021-11-22 RX ORDER — INSULIN GLARGINE 100 [IU]/ML
16 INJECTION, SOLUTION SUBCUTANEOUS DAILY
Qty: 6 ML | Refills: 3 | Status: SHIPPED | OUTPATIENT
Start: 2021-11-22 | End: 2023-06-22

## 2021-11-22 RX ORDER — PEN NEEDLE, DIABETIC 31 GX5/16"
NEEDLE, DISPOSABLE MISCELLANEOUS
Qty: 100 EACH | Refills: 3 | Status: SHIPPED | OUTPATIENT
Start: 2021-11-22

## 2022-01-20 ENCOUNTER — OFFICE VISIT (OUTPATIENT)
Dept: FAMILY MEDICINE | Facility: CLINIC | Age: 37
End: 2022-01-20
Payer: COMMERCIAL

## 2022-01-20 DIAGNOSIS — J32.9 RECURRENT SINUSITIS: Primary | ICD-10-CM

## 2022-01-20 PROCEDURE — 1160F RVW MEDS BY RX/DR IN RCRD: CPT | Mod: CPTII,95,, | Performed by: NURSE PRACTITIONER

## 2022-01-20 PROCEDURE — 1159F MED LIST DOCD IN RCRD: CPT | Mod: CPTII,95,, | Performed by: NURSE PRACTITIONER

## 2022-01-20 PROCEDURE — 99214 OFFICE O/P EST MOD 30 MIN: CPT | Mod: 95,,, | Performed by: NURSE PRACTITIONER

## 2022-01-20 PROCEDURE — 99214 PR OFFICE/OUTPT VISIT, EST, LEVL IV, 30-39 MIN: ICD-10-PCS | Mod: 95,,, | Performed by: NURSE PRACTITIONER

## 2022-01-20 PROCEDURE — 1159F PR MEDICATION LIST DOCUMENTED IN MEDICAL RECORD: ICD-10-PCS | Mod: CPTII,95,, | Performed by: NURSE PRACTITIONER

## 2022-01-20 PROCEDURE — 1160F PR REVIEW ALL MEDS BY PRESCRIBER/CLIN PHARMACIST DOCUMENTED: ICD-10-PCS | Mod: CPTII,95,, | Performed by: NURSE PRACTITIONER

## 2022-01-20 RX ORDER — FLUCONAZOLE 150 MG/1
150 TABLET ORAL WEEKLY
Qty: 2 TABLET | Refills: 0 | Status: SHIPPED | OUTPATIENT
Start: 2022-01-20 | End: 2022-01-28

## 2022-01-20 RX ORDER — AMOXICILLIN AND CLAVULANATE POTASSIUM 875; 125 MG/1; MG/1
1 TABLET, FILM COATED ORAL EVERY 12 HOURS
Qty: 14 TABLET | Refills: 0 | Status: SHIPPED | OUTPATIENT
Start: 2022-01-20 | End: 2022-01-27

## 2022-01-20 NOTE — PROGRESS NOTES
The patient location is: at home in [place], [place]  The chief complaint leading to consultation is: recurrent sinus allergies    Visit type: audiovisual    Face to Face time with patient: 10  20 minutes of total time spent on the encounter, which includes face to face time and non-face to face time preparing to see the patient (eg, review of tests), Obtaining and/or reviewing separately obtained history, Documenting clinical information in the electronic or other health record, Independently interpreting results (not separately reported) and communicating results to the patient/family/caregiver, or Care coordination (not separately reported).         Each patient to whom he or she provides medical services by telemedicine is:  (1) informed of the relationship between the physician and patient and the respective role of any other health care provider with respect to management of the patient; and (2) notified that he or she may decline to receive medical services by telemedicine and may withdraw from such care at any time.    Notes:   Subjective:      Patient ID: Greg Borja is a 36 y.o. female.  New to me but seen previously in clinic by a fellow provider. Pt presents with recurrent sinus allergies that began ~2wks ago with acute worsening a couple days ago. At home covid test negative last night. Using flonase, asteline, saline rinse and daily antihistamine without relief.     Sinus Problem  This is a recurrent problem. The current episode started 1 to 4 weeks ago. The problem has been gradually worsening since onset. There has been no fever. The pain is severe. Associated symptoms include chills, congestion, coughing, headaches, sinus pressure and a sore throat. Pertinent negatives include no diaphoresis, ear pain, hoarse voice, neck pain, shortness of breath, sneezing or swollen glands. The treatment provided no relief.     Review of Systems   Constitutional: Positive for chills. Negative for activity change,  diaphoresis and unexpected weight change.   HENT: Positive for nasal congestion, postnasal drip, sinus pressure/congestion and sore throat. Negative for ear pain, hearing loss, hoarse voice, rhinorrhea, sneezing and trouble swallowing.    Eyes: Negative for discharge and visual disturbance.   Respiratory: Positive for cough. Negative for chest tightness, shortness of breath and wheezing.    Cardiovascular: Negative for chest pain and palpitations.   Gastrointestinal: Negative for blood in stool, constipation, diarrhea and vomiting.   Endocrine: Negative for polydipsia and polyuria.   Genitourinary: Negative for difficulty urinating, dysuria, hematuria and menstrual problem.   Musculoskeletal: Negative for arthralgias, joint swelling and neck pain.   Neurological: Positive for headaches. Negative for weakness.   Psychiatric/Behavioral: Negative for confusion and dysphoric mood.         Objective:   There were no vitals filed for this visit.  Physical Exam  Constitutional:       General: She is not in acute distress.     Appearance: Normal appearance. She is well-developed, well-groomed and normal weight. She is not ill-appearing.   HENT:      Head: Normocephalic and atraumatic.      Right Ear: External ear normal.      Left Ear: External ear normal.      Nose: Congestion present.      Right Sinus: Maxillary sinus tenderness present.      Left Sinus: Maxillary sinus tenderness present.      Mouth/Throat:      Lips: Pink.      Mouth: Mucous membranes are moist.      Pharynx: Oropharynx is clear. Uvula midline.   Eyes:      General: Lids are normal. Vision grossly intact. Gaze aligned appropriately. Allergic shiner present. No scleral icterus.        Right eye: No discharge.         Left eye: No discharge.      Conjunctiva/sclera: Conjunctivae normal.   Neck:      Trachea: Phonation normal.   Pulmonary:      Effort: Pulmonary effort is normal. No accessory muscle usage or respiratory distress.   Abdominal:      General:  Abdomen is flat. There is no distension.   Musculoskeletal:      Cervical back: Neck supple.   Skin:     General: Skin is warm and dry.      Findings: No rash.   Neurological:      General: No focal deficit present.      Mental Status: She is alert and oriented to person, place, and time. Mental status is at baseline.      Motor: No abnormal muscle tone.      Gait: Gait normal.   Psychiatric:         Attention and Perception: Attention and perception normal.         Mood and Affect: Mood and affect normal.         Speech: Speech normal.         Behavior: Behavior normal. Behavior is cooperative.         Thought Content: Thought content normal.         Cognition and Memory: Cognition and memory normal.         Judgment: Judgment normal.       Assessment and Plan:     1. Recurrent sinusitis  Symptomatic therapy suggested: rest, increase fluids, gargle prn for sore throat, apply heat to sinuses prn, use mist of vaporizer prn, OTC acetaminophen, ibuprofen, antihistamine-decongestant of choice, cough suppressant of choice and call prn if symptoms persist or worsen. Call or return to clinic prn if these symptoms worsen or fail to improve as anticipated.  - amoxicillin-clavulanate 875-125mg (AUGMENTIN) 875-125 mg per tablet; Take 1 tablet by mouth every 12 (twelve) hours. for 7 days  Dispense: 14 tablet; Refill: 0  - fluconazole (DIFLUCAN) 150 MG Tab; Take 1 tablet (150 mg total) by mouth once a week. for 2 doses  Dispense: 2 tablet; Refill: 0           RAFAELA Wright, FNP-C  Family/Internal Medicine  [facility]

## 2022-02-10 ENCOUNTER — PATIENT OUTREACH (OUTPATIENT)
Dept: ADMINISTRATIVE | Facility: OTHER | Age: 37
End: 2022-02-10
Payer: COMMERCIAL

## 2022-02-10 NOTE — PROGRESS NOTES
Health Maintenance Due   Topic Date Due    COVID-19 Vaccine (1) Never done    TETANUS VACCINE  Never done    Eye Exam  08/01/2021    Influenza Vaccine (1) 09/01/2021    Foot Exam  09/23/2021    Hemoglobin A1c  12/09/2021    Lipid Panel  02/05/2022     Updates were requested from care everywhere.  Chart was reviewed for overdue Proactive [facility] Encounters ([facility]) topics (CRS, Breast Cancer Screening, Eye exam)  Health Maintenance has been updated.  LINKS immunization registry triggered.  Immunizations were reconciled.

## 2022-02-11 ENCOUNTER — OFFICE VISIT (OUTPATIENT)
Dept: ENDOCRINOLOGY | Facility: CLINIC | Age: 37
End: 2022-02-11
Payer: COMMERCIAL

## 2022-02-11 VITALS
DIASTOLIC BLOOD PRESSURE: 80 MMHG | OXYGEN SATURATION: 98 % | WEIGHT: 117.31 LBS | HEART RATE: 73 BPM | BODY MASS INDEX: 21.59 KG/M2 | HEIGHT: 62 IN | SYSTOLIC BLOOD PRESSURE: 120 MMHG

## 2022-02-11 DIAGNOSIS — Z96.41 INSULIN PUMP STATUS: Chronic | ICD-10-CM

## 2022-02-11 DIAGNOSIS — E10.65 TYPE 1 DIABETES MELLITUS WITH HYPERGLYCEMIA: Primary | Chronic | ICD-10-CM

## 2022-02-11 DIAGNOSIS — E04.2 MULTIPLE THYROID NODULES: ICD-10-CM

## 2022-02-11 PROCEDURE — 3079F DIAST BP 80-89 MM HG: CPT | Mod: CPTII,S$GLB,, | Performed by: INTERNAL MEDICINE

## 2022-02-11 PROCEDURE — 3008F BODY MASS INDEX DOCD: CPT | Mod: CPTII,S$GLB,, | Performed by: INTERNAL MEDICINE

## 2022-02-11 PROCEDURE — 3052F PR MOST RECENT HEMOGLOBIN A1C LEVEL 8.0 - < 9.0%: ICD-10-PCS | Mod: CPTII,S$GLB,, | Performed by: INTERNAL MEDICINE

## 2022-02-11 PROCEDURE — 99214 OFFICE O/P EST MOD 30 MIN: CPT | Mod: S$GLB,,, | Performed by: INTERNAL MEDICINE

## 2022-02-11 PROCEDURE — 3074F SYST BP LT 130 MM HG: CPT | Mod: CPTII,S$GLB,, | Performed by: INTERNAL MEDICINE

## 2022-02-11 PROCEDURE — 3074F PR MOST RECENT SYSTOLIC BLOOD PRESSURE < 130 MM HG: ICD-10-PCS | Mod: CPTII,S$GLB,, | Performed by: INTERNAL MEDICINE

## 2022-02-11 PROCEDURE — 99999 PR PBB SHADOW E&M-EST. PATIENT-LVL IV: CPT | Mod: PBBFAC,,, | Performed by: INTERNAL MEDICINE

## 2022-02-11 PROCEDURE — 99214 PR OFFICE/OUTPT VISIT, EST, LEVL IV, 30-39 MIN: ICD-10-PCS | Mod: S$GLB,,, | Performed by: INTERNAL MEDICINE

## 2022-02-11 PROCEDURE — 99999 PR PBB SHADOW E&M-EST. PATIENT-LVL IV: ICD-10-PCS | Mod: PBBFAC,,, | Performed by: INTERNAL MEDICINE

## 2022-02-11 PROCEDURE — 3052F HG A1C>EQUAL 8.0%<EQUAL 9.0%: CPT | Mod: CPTII,S$GLB,, | Performed by: INTERNAL MEDICINE

## 2022-02-11 PROCEDURE — 1159F MED LIST DOCD IN RCRD: CPT | Mod: CPTII,S$GLB,, | Performed by: INTERNAL MEDICINE

## 2022-02-11 PROCEDURE — 3008F PR BODY MASS INDEX (BMI) DOCUMENTED: ICD-10-PCS | Mod: CPTII,S$GLB,, | Performed by: INTERNAL MEDICINE

## 2022-02-11 PROCEDURE — 3079F PR MOST RECENT DIASTOLIC BLOOD PRESSURE 80-89 MM HG: ICD-10-PCS | Mod: CPTII,S$GLB,, | Performed by: INTERNAL MEDICINE

## 2022-02-11 PROCEDURE — 1159F PR MEDICATION LIST DOCUMENTED IN MEDICAL RECORD: ICD-10-PCS | Mod: CPTII,S$GLB,, | Performed by: INTERNAL MEDICINE

## 2022-02-11 RX ORDER — INSULIN ASPART 100 [IU]/ML
INJECTION, SOLUTION INTRAVENOUS; SUBCUTANEOUS
Qty: 60 ML | Refills: 11 | Status: SHIPPED | OUTPATIENT
Start: 2022-02-11 | End: 2022-11-10

## 2022-02-11 NOTE — PATIENT INSTRUCTIONS
12A basal - increase to 0.8 units/hr    Change carb ratios:  12A:     1:10  10A:     1:9  9P:       1:10
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
## 2022-02-11 NOTE — ASSESSMENT & PLAN NOTE
Reviewed goals of therapy are to get the best control we can without hypoglycemia. She is having too much hypoglycemia and post-prandial hyperglycemia (missing doses, sometimes due to fear of hypoglycemia).    Omnipod 5 has been FDA approved, we may switch to that in the near future.    Pump setting adjustments (changes in bold)    Basal:  12A:     0.80  430: 0.90  8P: 0.75    ICH:  12A: 1:10  10A: 1:9  9P: 1:10    ISF:  12A: 1:40    IAT: 3 hours    Target: 100-120     Reviewed patient's current insulin regimen. Clarified proper insulin dose and timing in relation to meals, etc. She is doing a good job bolusing for all meals lately.    Advised frequent self blood glucose monitoring. Patient encouraged to document glucose results and bring them to every clinic visit.    Hypoglycemia precautions discussed.     Close adherence to lifestyle changes recommended.      Eyes: due for eye exam  Feet: UTD  Kidneys: Microalbumin due in June, 2022  HbA1c:  Check today  Lipids:  LDL at goal - recheck today  Thyroid:  Normal  Celiac: had celiac testing done at [facility] within the past few years - negative.  Pregnancy: No plans for pregnancy, but she is aware of the glycemic goals being very stringent and that we would want her A1c below 6.0% before attempting pregnancy.  Glucagon:  Yes

## 2022-02-11 NOTE — ASSESSMENT & PLAN NOTE
Repeat U/S 3/2022    Discussed indications for repeat FNA as well as surgical indications (abnormal FNA, compressive symptoms or interval change)

## 2022-02-12 ENCOUNTER — LAB VISIT (OUTPATIENT)
Dept: LAB | Facility: HOSPITAL | Age: 37
End: 2022-02-12
Attending: INTERNAL MEDICINE
Payer: COMMERCIAL

## 2022-02-12 DIAGNOSIS — E10.65 TYPE 1 DIABETES MELLITUS WITH HYPERGLYCEMIA: Chronic | ICD-10-CM

## 2022-02-12 LAB
ALBUMIN SERPL BCP-MCNC: 3.9 G/DL (ref 3.5–5.2)
ALP SERPL-CCNC: 62 U/L (ref 55–135)
ALT SERPL W/O P-5'-P-CCNC: 12 U/L (ref 10–44)
ANION GAP SERPL CALC-SCNC: 9 MMOL/L (ref 8–16)
AST SERPL-CCNC: 17 U/L (ref 10–40)
BASOPHILS # BLD AUTO: 0.08 K/UL (ref 0–0.2)
BASOPHILS NFR BLD: 1.1 % (ref 0–1.9)
BILIRUB SERPL-MCNC: 0.6 MG/DL (ref 0.1–1)
BUN SERPL-MCNC: 13 MG/DL (ref 6–20)
CALCIUM SERPL-MCNC: 9.1 MG/DL (ref 8.7–10.5)
CHLORIDE SERPL-SCNC: 103 MMOL/L (ref 95–110)
CHOLEST SERPL-MCNC: 141 MG/DL (ref 120–199)
CHOLEST/HDLC SERPL: 2.5 {RATIO} (ref 2–5)
CO2 SERPL-SCNC: 28 MMOL/L (ref 23–29)
CREAT SERPL-MCNC: 0.7 MG/DL (ref 0.5–1.4)
DIFFERENTIAL METHOD: NORMAL
EOSINOPHIL # BLD AUTO: 0 K/UL (ref 0–0.5)
EOSINOPHIL NFR BLD: 0.5 % (ref 0–8)
ERYTHROCYTE [DISTWIDTH] IN BLOOD BY AUTOMATED COUNT: 11.9 % (ref 11.5–14.5)
EST. GFR  (AFRICAN AMERICAN): >60 ML/MIN/1.73 M^2
EST. GFR  (NON AFRICAN AMERICAN): >60 ML/MIN/1.73 M^2
ESTIMATED AVG GLUCOSE: 169 MG/DL (ref 68–131)
GLUCOSE SERPL-MCNC: 102 MG/DL (ref 70–110)
HBA1C MFR BLD: 7.5 % (ref 4–5.6)
HCT VFR BLD AUTO: 41.7 % (ref 37–48.5)
HDLC SERPL-MCNC: 57 MG/DL (ref 40–75)
HDLC SERPL: 40.4 % (ref 20–50)
HGB BLD-MCNC: 13.8 G/DL (ref 12–16)
IMM GRANULOCYTES # BLD AUTO: 0.02 K/UL (ref 0–0.04)
IMM GRANULOCYTES NFR BLD AUTO: 0.3 % (ref 0–0.5)
LDLC SERPL CALC-MCNC: 78 MG/DL (ref 63–159)
LYMPHOCYTES # BLD AUTO: 1.9 K/UL (ref 1–4.8)
LYMPHOCYTES NFR BLD: 25.3 % (ref 18–48)
MCH RBC QN AUTO: 30.2 PG (ref 27–31)
MCHC RBC AUTO-ENTMCNC: 33.1 G/DL (ref 32–36)
MCV RBC AUTO: 91 FL (ref 82–98)
MONOCYTES # BLD AUTO: 0.6 K/UL (ref 0.3–1)
MONOCYTES NFR BLD: 7.7 % (ref 4–15)
NEUTROPHILS # BLD AUTO: 4.8 K/UL (ref 1.8–7.7)
NEUTROPHILS NFR BLD: 65.1 % (ref 38–73)
NONHDLC SERPL-MCNC: 84 MG/DL
NRBC BLD-RTO: 0 /100 WBC
PLATELET # BLD AUTO: 320 K/UL (ref 150–450)
PMV BLD AUTO: 11 FL (ref 9.2–12.9)
POTASSIUM SERPL-SCNC: 4.3 MMOL/L (ref 3.5–5.1)
PROT SERPL-MCNC: 6.9 G/DL (ref 6–8.4)
RBC # BLD AUTO: 4.57 M/UL (ref 4–5.4)
SODIUM SERPL-SCNC: 140 MMOL/L (ref 136–145)
TRIGL SERPL-MCNC: 30 MG/DL (ref 30–150)
WBC # BLD AUTO: 7.3 K/UL (ref 3.9–12.7)

## 2022-02-12 PROCEDURE — 83036 HEMOGLOBIN GLYCOSYLATED A1C: CPT | Performed by: INTERNAL MEDICINE

## 2022-02-12 PROCEDURE — 80061 LIPID PANEL: CPT | Performed by: INTERNAL MEDICINE

## 2022-02-12 PROCEDURE — 80053 COMPREHEN METABOLIC PANEL: CPT | Performed by: INTERNAL MEDICINE

## 2022-02-12 PROCEDURE — 85025 COMPLETE CBC W/AUTO DIFF WBC: CPT | Performed by: INTERNAL MEDICINE

## 2022-02-12 PROCEDURE — 36415 COLL VENOUS BLD VENIPUNCTURE: CPT | Mod: PO | Performed by: INTERNAL MEDICINE

## 2022-02-14 ENCOUNTER — PATIENT MESSAGE (OUTPATIENT)
Dept: ENDOCRINOLOGY | Facility: CLINIC | Age: 37
End: 2022-02-14
Payer: COMMERCIAL

## 2022-08-15 DIAGNOSIS — E10.65 TYPE 1 DIABETES MELLITUS WITH HYPERGLYCEMIA: ICD-10-CM

## 2022-08-15 RX ORDER — FLASH GLUCOSE SENSOR
KIT MISCELLANEOUS
Qty: 2 KIT | Refills: 11 | Status: SHIPPED | OUTPATIENT
Start: 2022-08-15 | End: 2023-12-18

## 2022-08-24 ENCOUNTER — TELEPHONE (OUTPATIENT)
Dept: ENDOCRINOLOGY | Facility: CLINIC | Age: 37
End: 2022-08-24
Payer: COMMERCIAL

## 2022-08-24 ENCOUNTER — LAB VISIT (OUTPATIENT)
Dept: LAB | Facility: HOSPITAL | Age: 37
End: 2022-08-24
Attending: INTERNAL MEDICINE
Payer: COMMERCIAL

## 2022-08-24 ENCOUNTER — OFFICE VISIT (OUTPATIENT)
Dept: ENDOCRINOLOGY | Facility: CLINIC | Age: 37
End: 2022-08-24
Payer: COMMERCIAL

## 2022-08-24 DIAGNOSIS — E10.65 TYPE 1 DIABETES MELLITUS WITH HYPERGLYCEMIA: Primary | ICD-10-CM

## 2022-08-24 DIAGNOSIS — E10.65 TYPE 1 DIABETES MELLITUS WITH HYPERGLYCEMIA: ICD-10-CM

## 2022-08-24 DIAGNOSIS — Z96.41 INSULIN PUMP STATUS: Chronic | ICD-10-CM

## 2022-08-24 DIAGNOSIS — E04.2 MULTIPLE THYROID NODULES: ICD-10-CM

## 2022-08-24 LAB
ALBUMIN SERPL BCP-MCNC: 4.1 G/DL (ref 3.5–5.2)
ALP SERPL-CCNC: 68 U/L (ref 55–135)
ALT SERPL W/O P-5'-P-CCNC: 11 U/L (ref 10–44)
ANION GAP SERPL CALC-SCNC: 9 MMOL/L (ref 8–16)
AST SERPL-CCNC: 16 U/L (ref 10–40)
BASOPHILS # BLD AUTO: 0.07 K/UL (ref 0–0.2)
BASOPHILS NFR BLD: 1.4 % (ref 0–1.9)
BILIRUB SERPL-MCNC: 1 MG/DL (ref 0.1–1)
BUN SERPL-MCNC: 10 MG/DL (ref 6–20)
CALCIUM SERPL-MCNC: 8.8 MG/DL (ref 8.7–10.5)
CHLORIDE SERPL-SCNC: 103 MMOL/L (ref 95–110)
CO2 SERPL-SCNC: 27 MMOL/L (ref 23–29)
CREAT SERPL-MCNC: 0.7 MG/DL (ref 0.5–1.4)
DIFFERENTIAL METHOD: NORMAL
EOSINOPHIL # BLD AUTO: 0 K/UL (ref 0–0.5)
EOSINOPHIL NFR BLD: 0.8 % (ref 0–8)
ERYTHROCYTE [DISTWIDTH] IN BLOOD BY AUTOMATED COUNT: 11.6 % (ref 11.5–14.5)
EST. GFR  (NO RACE VARIABLE): >60 ML/MIN/1.73 M^2
ESTIMATED AVG GLUCOSE: 166 MG/DL (ref 68–131)
GLUCOSE SERPL-MCNC: 57 MG/DL (ref 70–110)
HBA1C MFR BLD: 7.4 % (ref 4–5.6)
HCT VFR BLD AUTO: 43 % (ref 37–48.5)
HGB BLD-MCNC: 14.2 G/DL (ref 12–16)
IMM GRANULOCYTES # BLD AUTO: 0.01 K/UL (ref 0–0.04)
IMM GRANULOCYTES NFR BLD AUTO: 0.2 % (ref 0–0.5)
LYMPHOCYTES # BLD AUTO: 1.7 K/UL (ref 1–4.8)
LYMPHOCYTES NFR BLD: 33.5 % (ref 18–48)
MCH RBC QN AUTO: 30.6 PG (ref 27–31)
MCHC RBC AUTO-ENTMCNC: 33 G/DL (ref 32–36)
MCV RBC AUTO: 93 FL (ref 82–98)
MONOCYTES # BLD AUTO: 0.4 K/UL (ref 0.3–1)
MONOCYTES NFR BLD: 8.6 % (ref 4–15)
NEUTROPHILS # BLD AUTO: 2.8 K/UL (ref 1.8–7.7)
NEUTROPHILS NFR BLD: 55.5 % (ref 38–73)
NRBC BLD-RTO: 0 /100 WBC
PLATELET # BLD AUTO: 317 K/UL (ref 150–450)
PMV BLD AUTO: 11.6 FL (ref 9.2–12.9)
POTASSIUM SERPL-SCNC: 4 MMOL/L (ref 3.5–5.1)
PROT SERPL-MCNC: 7 G/DL (ref 6–8.4)
RBC # BLD AUTO: 4.64 M/UL (ref 4–5.4)
SODIUM SERPL-SCNC: 139 MMOL/L (ref 136–145)
WBC # BLD AUTO: 5.1 K/UL (ref 3.9–12.7)

## 2022-08-24 PROCEDURE — 3061F PR NEG MICROALBUMINURIA RESULT DOCUMENTED/REVIEW: ICD-10-PCS | Mod: CPTII,95,, | Performed by: INTERNAL MEDICINE

## 2022-08-24 PROCEDURE — 99214 OFFICE O/P EST MOD 30 MIN: CPT | Mod: 95,,, | Performed by: INTERNAL MEDICINE

## 2022-08-24 PROCEDURE — 3066F NEPHROPATHY DOC TX: CPT | Mod: CPTII,95,, | Performed by: INTERNAL MEDICINE

## 2022-08-24 PROCEDURE — 85025 COMPLETE CBC W/AUTO DIFF WBC: CPT | Performed by: INTERNAL MEDICINE

## 2022-08-24 PROCEDURE — 3066F PR DOCUMENTATION OF TREATMENT FOR NEPHROPATHY: ICD-10-PCS | Mod: CPTII,95,, | Performed by: INTERNAL MEDICINE

## 2022-08-24 PROCEDURE — 99214 PR OFFICE/OUTPT VISIT, EST, LEVL IV, 30-39 MIN: ICD-10-PCS | Mod: 95,,, | Performed by: INTERNAL MEDICINE

## 2022-08-24 PROCEDURE — 36415 COLL VENOUS BLD VENIPUNCTURE: CPT | Mod: PO | Performed by: INTERNAL MEDICINE

## 2022-08-24 PROCEDURE — 3051F PR MOST RECENT HEMOGLOBIN A1C LEVEL 7.0 - < 8.0%: ICD-10-PCS | Mod: CPTII,95,, | Performed by: INTERNAL MEDICINE

## 2022-08-24 PROCEDURE — 80053 COMPREHEN METABOLIC PANEL: CPT | Performed by: INTERNAL MEDICINE

## 2022-08-24 PROCEDURE — 3051F HG A1C>EQUAL 7.0%<8.0%: CPT | Mod: CPTII,95,, | Performed by: INTERNAL MEDICINE

## 2022-08-24 PROCEDURE — 3061F NEG MICROALBUMINURIA REV: CPT | Mod: CPTII,95,, | Performed by: INTERNAL MEDICINE

## 2022-08-24 PROCEDURE — 83036 HEMOGLOBIN GLYCOSYLATED A1C: CPT | Performed by: INTERNAL MEDICINE

## 2022-08-24 NOTE — Clinical Note
Diabetes education in 2-4 weeks - help downloading pump and information about Omnipod 5 Schedule thyroid ultrasound F/u in 6 months Labs in 6 months prior to her next appt.
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
## 2022-08-24 NOTE — ASSESSMENT & PLAN NOTE
Reviewed goals of therapy are to get the best control we can without hypoglycemia.    Will set up DM education in a few weeks to download her pump data and also discuss Omnipod 5. She would need to switch to Dexcom in that case.    Pump settings were not changed today. Will wait for her download.    Basal:   12A:     0.80  430: 0.90  8P: 0.75    ICH:  12A: 1:10  10A: 1:9  9P: 1:10    ISF:  12A: 1:40    IAT: 3 hours    Target: 100-120     Reviewed patient's current insulin regimen. Clarified proper insulin dose and timing in relation to meals, etc. She is doing a good job bolusing for all meals lately.    Advised frequent self blood glucose monitoring. Patient encouraged to document glucose results and bring them to every clinic visit.    Hypoglycemia precautions discussed.     Close adherence to lifestyle changes recommended.      Eyes: UTD  Feet: UTD  Kidneys: UTD, next Microalbumin due in Aug, 2023  HbA1c:  Check in 6 months  Lipids:  LDL at goal - recheck 6 mos  Thyroid:  Normal  Celiac: had celiac testing done at [facility] within the past few years - negative.  Pregnancy: No plans for pregnancy, but she is aware of the glycemic goals being very stringent and that we would want her A1c below 6.0% before attempting pregnancy.  Glucagon:  Yes

## 2022-08-24 NOTE — ASSESSMENT & PLAN NOTE
Repeat U/S now.    Discussed indications for repeat FNA as well as surgical indications (abnormal FNA, compressive symptoms or interval change)

## 2022-09-23 ENCOUNTER — CLINICAL SUPPORT (OUTPATIENT)
Dept: DIABETES | Facility: CLINIC | Age: 37
End: 2022-09-23
Payer: COMMERCIAL

## 2022-09-23 VITALS — BODY MASS INDEX: 20.6 KG/M2 | WEIGHT: 112.63 LBS

## 2022-09-23 DIAGNOSIS — E10.65 TYPE 1 DIABETES MELLITUS WITH HYPERGLYCEMIA: ICD-10-CM

## 2022-09-23 PROCEDURE — G0108 DIAB MANAGE TRN  PER INDIV: HCPCS | Mod: S$GLB,,, | Performed by: INTERNAL MEDICINE

## 2022-09-23 PROCEDURE — 99999 PR PBB SHADOW E&M-EST. PATIENT-LVL II: CPT | Mod: PBBFAC,,,

## 2022-09-23 PROCEDURE — G0108 PR DIAB MANAGE TRN  PER INDIV: ICD-10-PCS | Mod: S$GLB,,, | Performed by: INTERNAL MEDICINE

## 2022-09-23 PROCEDURE — 99999 PR PBB SHADOW E&M-EST. PATIENT-LVL II: ICD-10-PCS | Mod: PBBFAC,,,

## 2022-09-23 NOTE — PROGRESS NOTES
Diabetes Care Specialist Progress Note  Author: Bertha Hung RN, CDE  Date: 9/23/2022    Program Intake  Reason for Diabetes Program Visit:: Initial Diabetes Assessment  Current diabetes risk level:: moderate  In the last 12 months, have you:: none  Permission to speak with others about care:: no    Lab Results   Component Value Date    HGBA1C 7.4 (H) 08/24/2022       Clinical     Clinical Assessment  Current Diabetes Treatment: Insulin pump  Have you ever experienced hypoglycemia (low blood sugar)?: no  Have you ever experienced hyperglycemia (high blood sugar)?: yes  In the last month, how often have you experienced high blood sugar?: more than once a week  Have you ever been hospitalized because your blood sugar was high?: no    Medication Information  Medication adherence impacting ability to self-manage diabetes?: No       Additional Social History    Support  Does anyone support you with your diabetes care?: yes  Who supports you?: spouse  Who takes you to your medical appointments?: self  Does the current support meet the patient's needs?: Yes  Is Support an area impacting ability to self-manage diabetes?: No    Access to [employer] Media & Technology  Does the patient have access to any of the following devices or technologies?: Smart phone, Home computer  Media or technology needs impacting ability to self-manage diabetes?: No    Cognitive/Behavioral Health  Alert and Oriented: Yes  Difficulty Thinking: No  Requires Prompting: No  Requires assistance for routine expression?: No  Cognitive or behavioral barriers impacting ability to self-manage diabetes?: No    Communication  Language preference: English  Hearing Problems: No  Vision Problems: No  Communication needs impacting ability to self-manage diabetes?: No    Health Literacy  Preferred Learning Method: Face to Face, Reading Materials, Demonstration  How often do you need to have someone help you read instructions, pamphlets, or written material from your  doctor or pharmacy?: Never  Health literacy needs impacting ability to self-manage diabetes?: No      Diabetes Self-Management Skills Assessment    Diabetes Disease Process/Treatment Options  Patient/caregiver able to state what happens when someone has diabetes.: yes  Patient/caregiver knows what type of diabetes they have.: yes  Diabetes Type : Type I  Diabetes Disease Process/Treatment Options: Skills Assessment Completed: Yes  Assessment indicates:: Adequate understanding  Area of need?: No    Nutrition/Healthy Eating  Challenges to healthy eating:: portion control, eating out, going to parties  Method of carbohydrate measurement:: no method  Patient can identify foods that impact blood sugar.: yes  Patient-identified foods:: fruit/fruit juice, starches (bread, pasta, rice, cereal), milk, soda, starchy vegetables (corn, peas, beans), sweets, yogurt  Nutrition/Healthy Eating Skills Assessment Completed:: Yes  Assessment indicates:: Knowledge deficit  Area of need?: Yes    Physical Activity/Exercise  Physical Activity/Exercise Skills Assessment Completed: : No  Deffered due to:: Time    Medications  Patient is able to describe current diabetes management routine.: yes  Diabetes management routine:: insulin pump  Patient is able to identify current diabetes medications, dosages, and appropriate timing of medications.: yes  Patient understands the purpose of the medications taken for diabetes.: yes  Patient reports problems or concerns with current medication regimen.: no  Medication Skills Assessment Completed:: Yes  Assessment indicates:: Adequate understanding  Area of need?: No    Home Blood Glucose Monitoring  Patient states that blood sugar is checked at home daily.: yes  Monitoring Method:: home glucometer  Home glucometer meter type:: insurance preferred meter  How often do you check your blood sugar?: Twice a day  When you check what is your typical blood sugar range? : see pump download  Blood glucose  logs:: yes  Blood glucose logs reviewed today?: yes  Home Blood Glucose Monitoring Skills Assessment Completed: : Yes  Assessment indicates:: Adequate understanding  Area of need?: No    Acute Complications  Acute Complications Skills Assessment Completed: : No  Deffered due to:: Time    Chronic Complications  Chronic Complications Skills Assessment Completed: : No  Deferred due to:: Time    Psychosocial/Coping  Psychosocial/Coping Skills Assessment Completed: : No  Deffered due to:: Time      Diabetes Self Support Plan         Assessment Summary and Plan    Based on today's diabetes care assessment, the following areas of need were identified:      Social 9/23/2022   Support No   Access to [employer] No   Cognitive/Behavioral Health No   Communication No   Health Literacy No        Clinical 9/23/2022   Medication Adherence No        Diabetes Self-Management Skills 9/23/2022   Diabetes Disease Process/Treatment Options No   Nutrition/Healthy Eating Yes, see care planning   Medication No, see care planning   Home Blood Glucose Monitoring No          Today's interventions were provided through individual discussion, instruction, and written materials were provided.      Patient verbalized understanding of instruction and written materials.  Pt was able to return back demonstration of instructions today. Patient understood key points, needs reinforcement and further instruction.     Diabetes Self-Management Care Plan:    Today's Diabetes Self-Management Care Plan was developed with Greg's input. Greg has agreed to work toward the following goal(s) to improve his/her overall diabetes control.      Care Plan: Diabetes Management   Updates made since 8/24/2022 12:00 AM        Problem: Medications         Goal: Patient Agrees to take Diabetes Medication(s) per insulin pump as prescribed.    Start Date: 9/23/2022   Expected End Date: 10/24/2022   Priority: High   Barriers: No Barriers Identified   Note:     Patient seen in clinic today for evaluation of DASH download and to talk about upgrading to the Omnipod 5.  DASH was downloaded and evaluated.  Currently only using glucometer to check glucose.  Admits to multiple missed bolus doses.  Often eats and forgets to enter the carbs into the pump.  Does give correction when needed, but usually is not enough.  Today we linked her current DASH to the [facility] account.  To encouraged compliance with bolusing, programmed 3 meal time reminders to help her give her doses.    We talked about the Omnipod 5.  She was shown a demo simulator.  Explained that it will adjust insulin to get to a prescribed goal target with Dexcom integration.  Explained that she does need to enter all her carbs into the pump so that she can get the correct dose of insulin.     Patient would like to move forward with the Dexcom G6.  Explained she needs that to get the full benefit of the new system.           Task: Discussed guidelines for preventing, detecting and treating hypoglycemia and hyperglycemia and reviewed the importance of meal and medication timing with diabetes mediations for prevention of hypoglycemia and maximum drug benefit. Completed 9/23/2022        Problem: Healthy Eating         Goal: Eat 2-3 meals daily with 30-45g/2-3 servings of Carbohydrate per meal. Limit snacking in between meal to 1 serving (15 grams).    Start Date: 9/23/2022   Expected End Date: 10/24/2022   Priority: Medium   Barriers: No Barriers Identified   Note:    Reviewed meal planning and general nutritional counseling with regards to diabetes management. Reviewed basic Carbohydrate Counting principles--Food groups, label reading, use of dry measuring cups for accurate portion sizes. Encouraged to use measuring cups and food labels to accurately calculation her portions.  Carb counting materials were provided.        Task: Reviewed the sources and role of Carbohydrate, Protein, and Fat and how each  nutrient impacts blood sugar. Completed 9/23/2022        Task: Provided visual examples using dry measuring cups, food models, and other familiar objects such as computer mouse, deck or cards, tennis ball etc. to help with visualization of portions. Completed 9/23/2022        Task: Explained how to count carbohydrates using the food label and the use of dry measuring cups for accurate carb counting. Completed 9/23/2022        Task: Discussed strategies for choosing healthier menu options when dining out. Completed 9/23/2022     Task: Review the importance of balancing carbohydrates with each meal using portion control techniques to count servings of carbohydrate and label reading to identify serving size and amount of total carbs per serving. Completed 9/23/2022        Task: Provided Sample plate method and reviewed the use of the plate to estimate amounts of carbohydrate per meal. Completed 9/23/2022          Follow Up Plan     Plan: Will send request to Dr. Sinclair to send in RX for Dexcom, pt will call for training.  She will then let us know if she wants to move forward with the Omnipod 5.     Today's care plan and follow up schedule was discussed with patient.  Greg verbalized understanding of the care plan, goals, and agrees to follow up plan.        The patient was encouraged to communicate with his/her health care provider/physician and care team regarding his/her condition(s) and treatment.  I provided the patient with my contact information today and encouraged to contact me via phone or [facility] Portal as needed.     Length of Visit   Total Time: 75 Minutes

## 2022-10-04 ENCOUNTER — PATIENT MESSAGE (OUTPATIENT)
Dept: ENDOCRINOLOGY | Facility: CLINIC | Age: 37
End: 2022-10-04
Payer: COMMERCIAL

## 2022-10-04 ENCOUNTER — PATIENT MESSAGE (OUTPATIENT)
Dept: DIABETES | Facility: CLINIC | Age: 37
End: 2022-10-04
Payer: COMMERCIAL

## 2022-10-18 ENCOUNTER — PATIENT MESSAGE (OUTPATIENT)
Dept: ENDOCRINOLOGY | Facility: CLINIC | Age: 37
End: 2022-10-18
Payer: COMMERCIAL

## 2022-10-18 ENCOUNTER — TELEPHONE (OUTPATIENT)
Dept: ENDOCRINOLOGY | Facility: CLINIC | Age: 37
End: 2022-10-18
Payer: COMMERCIAL

## 2022-10-18 DIAGNOSIS — E10.65 TYPE 1 DIABETES MELLITUS WITH HYPERGLYCEMIA: Primary | ICD-10-CM

## 2022-10-18 RX ORDER — BLOOD-GLUCOSE SENSOR
3 EACH MISCELLANEOUS
Qty: 9 EACH | Refills: 3 | Status: SHIPPED | OUTPATIENT
Start: 2022-10-18 | End: 2023-06-23 | Stop reason: SDUPTHER

## 2022-10-18 RX ORDER — BLOOD-GLUCOSE TRANSMITTER
1 EACH MISCELLANEOUS
Qty: 1 EACH | Refills: 3 | Status: SHIPPED | OUTPATIENT
Start: 2022-10-18 | End: 2023-06-23 | Stop reason: SDUPTHER

## 2022-11-09 ENCOUNTER — PATIENT MESSAGE (OUTPATIENT)
Dept: DIABETES | Facility: CLINIC | Age: 37
End: 2022-11-09
Payer: COMMERCIAL

## 2023-05-23 ENCOUNTER — PATIENT OUTREACH (OUTPATIENT)
Dept: ADMINISTRATIVE | Facility: HOSPITAL | Age: 38
End: 2023-05-23
Payer: COMMERCIAL

## 2023-05-23 NOTE — PROGRESS NOTES
Health Maintenance Due   Topic Date Due    COVID-19 Vaccine (1) Never done    TETANUS VACCINE  Never done    Pneumococcal Vaccines (Age 0-64) (2 - PCV) 06/10/2021    Lipid Panel  02/12/2023    Hemoglobin A1c  02/24/2023   Chart review done. HM updated. Immunizations reviewed & updated. Care Everywhere updated.  I CALLED THE PATIENT ABOUT HER PCP STATUS. THE PATIENT DECLINED APPOINTMENT AT THIS TIME

## 2023-06-22 ENCOUNTER — PATIENT MESSAGE (OUTPATIENT)
Dept: ENDOCRINOLOGY | Facility: CLINIC | Age: 38
End: 2023-06-22
Payer: COMMERCIAL

## 2023-06-22 ENCOUNTER — OFFICE VISIT (OUTPATIENT)
Dept: FAMILY MEDICINE | Facility: CLINIC | Age: 38
End: 2023-06-22
Payer: COMMERCIAL

## 2023-06-22 VITALS
RESPIRATION RATE: 16 BRPM | SYSTOLIC BLOOD PRESSURE: 120 MMHG | DIASTOLIC BLOOD PRESSURE: 70 MMHG | OXYGEN SATURATION: 98 % | HEART RATE: 79 BPM | TEMPERATURE: 98 F | WEIGHT: 110.88 LBS | BODY MASS INDEX: 20.4 KG/M2 | HEIGHT: 62 IN

## 2023-06-22 DIAGNOSIS — H66.93 BILATERAL OTITIS MEDIA, UNSPECIFIED OTITIS MEDIA TYPE: Primary | ICD-10-CM

## 2023-06-22 DIAGNOSIS — J30.9 CHRONIC ALLERGIC RHINITIS: ICD-10-CM

## 2023-06-22 PROCEDURE — 99999 PR PBB SHADOW E&M-EST. PATIENT-LVL IV: CPT | Mod: PBBFAC,,, | Performed by: NURSE PRACTITIONER

## 2023-06-22 PROCEDURE — 3078F DIAST BP <80 MM HG: CPT | Mod: CPTII,S$GLB,, | Performed by: NURSE PRACTITIONER

## 2023-06-22 PROCEDURE — 3008F BODY MASS INDEX DOCD: CPT | Mod: CPTII,S$GLB,, | Performed by: NURSE PRACTITIONER

## 2023-06-22 PROCEDURE — 3074F SYST BP LT 130 MM HG: CPT | Mod: CPTII,S$GLB,, | Performed by: NURSE PRACTITIONER

## 2023-06-22 PROCEDURE — 99214 OFFICE O/P EST MOD 30 MIN: CPT | Mod: S$GLB,,, | Performed by: NURSE PRACTITIONER

## 2023-06-22 PROCEDURE — 3008F PR BODY MASS INDEX (BMI) DOCUMENTED: ICD-10-PCS | Mod: CPTII,S$GLB,, | Performed by: NURSE PRACTITIONER

## 2023-06-22 PROCEDURE — 3078F PR MOST RECENT DIASTOLIC BLOOD PRESSURE < 80 MM HG: ICD-10-PCS | Mod: CPTII,S$GLB,, | Performed by: NURSE PRACTITIONER

## 2023-06-22 PROCEDURE — 3074F PR MOST RECENT SYSTOLIC BLOOD PRESSURE < 130 MM HG: ICD-10-PCS | Mod: CPTII,S$GLB,, | Performed by: NURSE PRACTITIONER

## 2023-06-22 PROCEDURE — 99214 PR OFFICE/OUTPT VISIT, EST, LEVL IV, 30-39 MIN: ICD-10-PCS | Mod: S$GLB,,, | Performed by: NURSE PRACTITIONER

## 2023-06-22 PROCEDURE — 99999 PR PBB SHADOW E&M-EST. PATIENT-LVL IV: ICD-10-PCS | Mod: PBBFAC,,, | Performed by: NURSE PRACTITIONER

## 2023-06-22 RX ORDER — NEOMYCIN SULFATE, POLYMYXIN B SULFATE AND HYDROCORTISONE 10; 3.5; 1 MG/ML; MG/ML; [USP'U]/ML
4 SUSPENSION/ DROPS AURICULAR (OTIC) 3 TIMES DAILY
Qty: 10 ML | Refills: 1 | Status: SHIPPED | OUTPATIENT
Start: 2023-06-22

## 2023-06-22 NOTE — PROGRESS NOTES
Subjective:      Patient ID: Greg Borja is a 37 y.o. female.  Pt presents to clinic with recurrent ear pain that's been on and off for months. Chronic sinus allergies have been stable with daily oral antihistamine.     Otalgia   There is pain in both (R>L) ears. This is a recurrent problem. The current episode started more than 1 month ago. The problem has been waxing and waning. There has been no fever. The pain is moderate. Associated symptoms include rhinorrhea. Pertinent negatives include no abdominal pain, coughing, diarrhea, ear discharge, headaches, hearing loss, neck pain, rash, sore throat or vomiting. She has tried nothing for the symptoms. The treatment provided no relief. There is no history of a chronic ear infection, hearing loss or a tympanostomy tube.   Sinus Problem  This is a chronic problem. The current episode started more than 1 month ago. The problem has been waxing and waning since onset. There has been no fever. The pain is mild. Associated symptoms include congestion, ear pain, sinus pressure and sneezing. Pertinent negatives include no chills, coughing, diaphoresis, headaches, hoarse voice, neck pain, shortness of breath, sore throat or swollen glands. Treatments tried: oral antihistamine. The treatment provided moderate relief.   Review of Systems   Constitutional:  Positive for fatigue. Negative for activity change, appetite change, chills, diaphoresis, fever and unexpected weight change.   HENT:  Positive for nasal congestion, ear pain, rhinorrhea, sinus pressure/congestion and sneezing. Negative for ear discharge, hearing loss, hoarse voice and sore throat.    Respiratory:  Negative for cough, chest tightness, shortness of breath and wheezing.    Cardiovascular:  Negative for chest pain and palpitations.   Gastrointestinal:  Negative for abdominal pain, change in bowel habit, constipation, diarrhea, nausea, vomiting and change in bowel habit.   Genitourinary:  Negative for  "difficulty urinating, dysuria and menstrual problem.   Musculoskeletal:  Negative for arthralgias, back pain, gait problem, myalgias and neck pain.   Integumentary:  Negative for rash.   Neurological:  Negative for headaches.   Psychiatric/Behavioral: Negative.     All other systems reviewed and are negative.      Objective:     Vitals:    06/22/23 1601   BP: 120/70   Pulse: 79   Resp: 16   Temp: 98.2 °F (36.8 °C)   TempSrc: Oral   SpO2: 98%   Weight: 50.3 kg (110 lb 14.3 oz)   Height: 5' 2" (1.575 m)     Physical Exam  Vitals and nursing note reviewed.   Constitutional:       General: She is not in acute distress.     Appearance: Normal appearance. She is well-developed and well-groomed. She is not ill-appearing.   HENT:      Head: Normocephalic and atraumatic.      Right Ear: Ear canal and external ear normal. A middle ear effusion is present. There is no impacted cerumen. No mastoid tenderness. Tympanic membrane is injected. Tympanic membrane is not bulging.      Left Ear: Ear canal and external ear normal. A middle ear effusion is present. There is no impacted cerumen. No mastoid tenderness. Tympanic membrane is injected. Tympanic membrane is not bulging.      Nose: Mucosal edema present. No congestion.      Mouth/Throat:      Lips: Pink.      Mouth: Mucous membranes are moist.      Pharynx: Oropharynx is clear. Uvula midline. Posterior oropharyngeal erythema present. No pharyngeal swelling, oropharyngeal exudate or uvula swelling.   Eyes:      General: Lids are normal. Vision grossly intact. Gaze aligned appropriately. Allergic shiner present.      Extraocular Movements: Extraocular movements intact.      Conjunctiva/sclera: Conjunctivae normal.      Pupils: Pupils are equal, round, and reactive to light.   Neck:      Trachea: Phonation normal.   Cardiovascular:      Rate and Rhythm: Normal rate and regular rhythm.      Heart sounds: Normal heart sounds.   Pulmonary:      Effort: Pulmonary effort is normal. No " accessory muscle usage or respiratory distress.      Breath sounds: Normal breath sounds and air entry.   Abdominal:      General: Abdomen is flat. Bowel sounds are normal. There is no distension.      Palpations: Abdomen is soft.      Tenderness: There is no abdominal tenderness.   Musculoskeletal:      Cervical back: Neck supple.      Right lower leg: No edema.      Left lower leg: No edema.   Lymphadenopathy:      Cervical: No cervical adenopathy.   Skin:     General: Skin is warm and dry.      Findings: No rash.   Neurological:      General: No focal deficit present.      Mental Status: She is alert and oriented to person, place, and time. Mental status is at baseline.      Sensory: Sensation is intact.      Motor: Motor function is intact.      Coordination: Coordination is intact.      Gait: Gait is intact.   Psychiatric:         Attention and Perception: Attention and perception normal.         Mood and Affect: Mood and affect normal.         Speech: Speech normal.         Behavior: Behavior normal. Behavior is cooperative.         Thought Content: Thought content normal.         Cognition and Memory: Cognition and memory normal.         Judgment: Judgment normal.     Assessment and Plan:     1. Bilateral otitis media, unspecified otitis media type  Treatment:  topical treatment, no oral antibiotics indicated at this time .  OTC analgesia as needed.  Follow up if not improving.   - neomycin-polymyxin-hydrocortisone (CORTISPORIN) 3.5-10,000-1 mg/mL-unit/mL-% otic suspension; Place 4 drops into both ears 3 (three) times daily.  Dispense: 10 mL; Refill: 1    2. Chronic allergic rhinitis  Continue daily oral antihistamine and intranasal steroid   Symptomatic therapy suggested: rest, increase fluids, gargle prn for sore throat, apply heat to sinuses prn, use mist of vaporizer prn, OTC acetaminophen, ibuprofen, antihistamine-decongestant of choice, cough suppressant of choice, and call prn if symptoms persist or  worsen. Call or return to clinic prn if these symptoms worsen or fail to improve as anticipated.        RAFAELA Wright, FNP-C  Family/Internal Medicine  [facility]

## 2023-06-23 ENCOUNTER — OFFICE VISIT (OUTPATIENT)
Dept: ENDOCRINOLOGY | Facility: CLINIC | Age: 38
End: 2023-06-23
Payer: COMMERCIAL

## 2023-06-23 DIAGNOSIS — E10.65 TYPE 1 DIABETES MELLITUS WITH HYPERGLYCEMIA: Primary | ICD-10-CM

## 2023-06-23 DIAGNOSIS — E04.2 MULTIPLE THYROID NODULES: ICD-10-CM

## 2023-06-23 DIAGNOSIS — Z96.41 INSULIN PUMP STATUS: Chronic | ICD-10-CM

## 2023-06-23 PROBLEM — J30.9 CHRONIC ALLERGIC RHINITIS: Status: ACTIVE | Noted: 2023-06-23

## 2023-06-23 PROCEDURE — 1159F PR MEDICATION LIST DOCUMENTED IN MEDICAL RECORD: ICD-10-PCS | Mod: CPTII,95,, | Performed by: INTERNAL MEDICINE

## 2023-06-23 PROCEDURE — 99214 OFFICE O/P EST MOD 30 MIN: CPT | Mod: 95,,, | Performed by: INTERNAL MEDICINE

## 2023-06-23 PROCEDURE — 1160F PR REVIEW ALL MEDS BY PRESCRIBER/CLIN PHARMACIST DOCUMENTED: ICD-10-PCS | Mod: CPTII,95,, | Performed by: INTERNAL MEDICINE

## 2023-06-23 PROCEDURE — 1160F RVW MEDS BY RX/DR IN RCRD: CPT | Mod: CPTII,95,, | Performed by: INTERNAL MEDICINE

## 2023-06-23 PROCEDURE — 99214 PR OFFICE/OUTPT VISIT, EST, LEVL IV, 30-39 MIN: ICD-10-PCS | Mod: 95,,, | Performed by: INTERNAL MEDICINE

## 2023-06-23 PROCEDURE — 1159F MED LIST DOCD IN RCRD: CPT | Mod: CPTII,95,, | Performed by: INTERNAL MEDICINE

## 2023-06-23 RX ORDER — INSULIN PMP CART,AUT,G6/7,CNTR
1 EACH SUBCUTANEOUS
Qty: 15 EACH | Refills: 11 | Status: SHIPPED | OUTPATIENT
Start: 2023-06-23

## 2023-06-23 RX ORDER — BLOOD-GLUCOSE SENSOR
3 EACH MISCELLANEOUS
Qty: 9 EACH | Refills: 3 | Status: SHIPPED | OUTPATIENT
Start: 2023-06-23

## 2023-06-23 RX ORDER — BLOOD-GLUCOSE TRANSMITTER
1 EACH MISCELLANEOUS
Qty: 1 EACH | Refills: 3 | Status: SHIPPED | OUTPATIENT
Start: 2023-06-23

## 2023-06-23 RX ORDER — INSULIN PMP CART,AUT,G6/7,CNTR
1 EACH SUBCUTANEOUS
Qty: 1 EACH | Refills: 0 | Status: SHIPPED | OUTPATIENT
Start: 2023-06-23

## 2023-06-23 NOTE — ASSESSMENT & PLAN NOTE
Reviewed goals of therapy are to get the best control we can without hypoglycemia.    We will switch her to Omnipod 5+ Dexcom G6.  I asked her to let me know once she gets the supplies from pharmacy so that we can set up an Education visit to set up her new pump system.    Close adherence to lifestyle changes recommended.      Eyes: UTD  Feet: UTD  Kidneys:  Microalbumin was mildly elevated on this most recent visit.  We will repeat that in three months.  Creatinine is normal.  HbA1c:  Check in 3 months  Lipids:  LDL at goal - recheck yearly  Thyroid:  Normal  Celiac: had celiac testing done at [facility] within the past few years - negative.  Pregnancy: No plans for pregnancy, but she is aware of the glycemic goals being very stringent and that we would want her A1c below 6.0% before attempting pregnancy.  Glucagon:  Yes

## 2023-06-23 NOTE — PROGRESS NOTES
Subjective:      Chief Complaint: Type 1 diabetes mellitus    HPI: Greg Borja is a 37 y.o. female who is here for follow-up evaluation for type 1 diabetes mellitus.    Last seen by me 8/24/2022.    With regards to the diabetes:    She's had a lot of issues with her Omnipod DASH PDM lately. They sent a replacement due to a battery issue, but the new pump is dying after a few hours and requires recharge. She also admits to missing boluses and not monitoring as well as she should due to life stressors.  She is interested in switching to the Omnipod 5 system.    The patient was initially diagnosed with Type 1 diabetes mellitus: Age 11    Known diabetic complications:  Hypoglycemia and hyperglycemia  Cardiovascular risk factors: Diabetes    Current diabetic medications include:   Omnipod with U-100 Novolog:     Basal:  12A:     0.80  8A: 0.90  10P: 0.75     ICH:  12A:     1:10  10A:     1:9  9P:       1:10     ISF:  12A:     1:40     IAT:      3 hours     Target: 100-120        Pump type:    Infusion set type: Omnipod Dash  Change infusion set: Every 3 days  Change insertion site: with change of infusion set.   Location of insertion site: Abdomen, hips     She had trouble uploading her pump data to [employer].    Prior visit with dietician: has seen DM educator at [facility]      BP Readings from Last 3 Encounters:   06/22/23 120/70   02/11/22 120/80   06/07/21 122/70     Use of ACE/ARB: No, Not Indicated          Wt Readings from Last 10 Encounters:   06/22/23 50.3 kg (110 lb 14.3 oz)   09/23/22 51.1 kg (112 lb 10.5 oz)   02/11/22 53.2 kg (117 lb 4.6 oz)   06/07/21 50 kg (110 lb 3.7 oz)   03/08/21 52.7 kg (116 lb 2.9 oz)   09/23/20 53.7 kg (118 lb 4.4 oz)   08/03/20 53.9 kg (118 lb 15 oz)   07/08/20 54.5 kg (120 lb 2.4 oz)   06/10/20 53.2 kg (117 lb 4.6 oz)   08/30/19 54 kg (119 lb 0.8 oz)     Diabetes Management Status    Statin: Not taking (child-bearing age)  ACE/ARB: Not taking (child-bearing age)      Screening or  Prevention Patient's value Goal Complete/Controlled?   HgA1C Testing and Control   Lab Results   Component Value Date    HGBA1C 8.2 (H) 06/22/2023      Annually/Less than 8% No   Lipid profile : 06/22/2023 Annually No   LDL control Lab Results   Component Value Date    LDLCALC 78.0 02/12/2022    Annually/Less than 100 mg/dl  No   Nephropathy screening Lab Results   Component Value Date    LABMICR <5.0 08/24/2022     No results found for: PROTEINUA  No results found for: UTPCR   Annually Yes   Blood pressure BP Readings from Last 1 Encounters:   06/22/23 120/70    Less than 140/90 Yes   Dilated retinal exam : 08/03/2022  Annually No   Foot exam   : 08/24/2022 Annually Yes         No plans for additional pregnancies.    With regards to the thyroid nodules:    Thyroid nodules originally found by self palpation and evaluated by U/S on 3/25/2021    Last ultrasound in 3/25/2021 was independently reviewed:     1.80 x 1.23 x 1.25 cm right inferior lobe nodule. The nodule is mostly solid with small cystic components with a isoechoic echotexture. Vascularity is Grade 2 (peripheral). Borders are well-defined. Microcalcifications are not present.     1.97 x 1.33 x 1.66 cm right middle lobe nodule. The nodule is mostly solid with small cystic components with a isoechoic echotexture. Vascularity is Grade 2 (peripheral). Borders are well-defined. Microcalcifications are not present.      No   Yes  [x]    [] Preexisting thyroid disease    Compressive Symptoms:  No  Yes  [x]    [] Anterior neck pressure  [x]    [] Dysphagia  [x]    [] Voice changes    Risk Factors:  No   Yes  [x]    [] Radiation to head or neck for any treatment of cancer or exposure to radiation  [x]    [] Personal history of colon or breast cancer  [x]    [] Tobacco use  [x]    [] Family history of thyroid cancer      Previous biopsy results:  Right middle lobe: [place] II (Benign) in 4/21  Right lower lobe: [place] II (Benign) in 4/21      Lab Results    Component Value Date    TSH 1.362 02/05/2021    TSH 0.981 08/03/2020    TSH 0.867 08/30/2019       Reviewed past medical, family, social history and updated as appropriate.    Objective:     There were no vitals filed for this visit.     BP Readings from Last 5 Encounters:   06/22/23 120/70   02/11/22 120/80   06/07/21 122/70   03/08/21 131/79   09/23/20 110/72     Physical Exam    Wt Readings from Last 10 Encounters:   06/22/23 1601 50.3 kg (110 lb 14.3 oz)   09/23/22 1528 51.1 kg (112 lb 10.5 oz)   02/11/22 1442 53.2 kg (117 lb 4.6 oz)   06/07/21 1022 50 kg (110 lb 3.7 oz)   03/08/21 1445 52.7 kg (116 lb 2.9 oz)   09/23/20 0834 53.7 kg (118 lb 4.4 oz)   08/03/20 1011 53.9 kg (118 lb 15 oz)   07/08/20 1607 54.5 kg (120 lb 2.4 oz)   06/10/20 1010 53.2 kg (117 lb 4.6 oz)   08/30/19 1123 54 kg (119 lb 0.8 oz)       Lab Results   Component Value Date    HGBA1C 8.2 (H) 06/22/2023    HGBA1C 7.4 (H) 08/24/2022    HGBA1C 7.5 (H) 02/12/2022    HGBA1C 8.1 (H) 06/09/2021     Lab Results   Component Value Date    CHOL 141 02/12/2022    CHOL 133 02/05/2021    HDL 57 02/12/2022    HDL 45 02/05/2021    LDLCALC 78.0 02/12/2022    LDLCALC 79.6 02/05/2021    TRIG 30 02/12/2022    TRIG 42 02/05/2021    CHOLHDL 40.4 02/12/2022    CHOLHDL 33.8 02/05/2021     Lab Results   Component Value Date     08/24/2022    K 4.0 08/24/2022     08/24/2022    CO2 27 08/24/2022    GLU 57 (L) 08/24/2022    BUN 10 08/24/2022    CREATININE 0.7 08/24/2022    CALCIUM 8.8 08/24/2022    PROT 7.0 08/24/2022    ALBUMIN 4.1 08/24/2022    BILITOT 1.0 08/24/2022    ALKPHOS 68 08/24/2022    AST 16 08/24/2022    ALT 11 08/24/2022    ANIONGAP 9 08/24/2022    ESTGFRAFRICA >60.0 02/12/2022    EGFRNONAA >60.0 02/12/2022    TSH 1.362 02/05/2021      Lab Results   Component Value Date    MICALBCREAT Unable to calculate 08/24/2022       Assessment/Plan:     Type 1 diabetes mellitus with hyperglycemia  Reviewed goals of therapy are to get the best control we  can without hypoglycemia.    We will switch her to Omnipod 5+ Dexcom G6.  I asked her to let me know once she gets the supplies from pharmacy so that we can set up an Education visit to set up her new pump system.    Close adherence to lifestyle changes recommended.      Eyes: UTD  Feet: UTD  Kidneys:  Microalbumin was mildly elevated on this most recent visit.  We will repeat that in three months.  Creatinine is normal.  HbA1c:  Check in 3 months  Lipids:  LDL at goal - recheck yearly  Thyroid:  Normal  Celiac: had celiac testing done at [facility] within the past few years - negative.  Pregnancy: No plans for pregnancy, but she is aware of the glycemic goals being very stringent and that we would want her A1c below 6.0% before attempting pregnancy.  Glucagon:  Yes    Insulin pump status  See above.  Will switch to Dexcom G6 plus Omnipod five.    Multiple thyroid nodules  Due for thyroid ultrasound.  She had to cancel the other one due to a scheduling conflict.  Will reschedule.       The patient location is: home  The chief complaint leading to consultation is: diabetes    Visit type: audiovisual    Face to Face time with patient: 25 minutes of total time spent on the encounter, which includes face to face time and non-face to face time preparing to see the patient (eg, review of tests), Obtaining and/or reviewing separately obtained history, Documenting clinical information in the electronic or other health record, Independently interpreting results (not separately reported) and communicating results to the patient/family/caregiver, or Care coordination (not separately reported).         Each patient to whom he or she provides medical services by telemedicine is:  (1) informed of the relationship between the physician and patient and the respective role of any other health care provider with respect to management of the patient; and (2) notified that he or she may decline to receive medical services by telemedicine  and may withdraw from such care at any time.    Notes:   Return to clinic in three months with labs.

## 2023-06-23 NOTE — ASSESSMENT & PLAN NOTE
Due for thyroid ultrasound.  She had to cancel the other one due to a scheduling conflict.  Will reschedule.

## 2023-12-17 DIAGNOSIS — E10.65 TYPE 1 DIABETES MELLITUS WITH HYPERGLYCEMIA: ICD-10-CM

## 2023-12-18 RX ORDER — FLASH GLUCOSE SENSOR
KIT MISCELLANEOUS
Qty: 6 KIT | Refills: 3 | Status: SHIPPED | OUTPATIENT
Start: 2023-12-18

## 2023-12-18 RX ORDER — BLOOD SUGAR DIAGNOSTIC
STRIP MISCELLANEOUS
Qty: 400 STRIP | Refills: 11 | Status: SHIPPED | OUTPATIENT
Start: 2023-12-18

## 2023-12-21 DIAGNOSIS — E10.65 TYPE 1 DIABETES MELLITUS WITH HYPERGLYCEMIA: Chronic | ICD-10-CM

## 2023-12-21 RX ORDER — INSULIN ASPART 100 [IU]/ML
INJECTION, SOLUTION INTRAVENOUS; SUBCUTANEOUS
Qty: 60 ML | Refills: 11 | Status: SHIPPED | OUTPATIENT
Start: 2023-12-21

## 2023-12-27 ENCOUNTER — E-VISIT (OUTPATIENT)
Dept: FAMILY MEDICINE | Facility: CLINIC | Age: 38
End: 2023-12-27
Payer: COMMERCIAL

## 2023-12-27 DIAGNOSIS — R05.9 COUGH, UNSPECIFIED TYPE: Primary | ICD-10-CM

## 2023-12-27 DIAGNOSIS — J01.00 SUBACUTE MAXILLARY SINUSITIS: ICD-10-CM

## 2023-12-27 PROCEDURE — 99421 OL DIG E/M SVC 5-10 MIN: CPT | Mod: ,,, | Performed by: STUDENT IN AN ORGANIZED HEALTH CARE EDUCATION/TRAINING PROGRAM

## 2023-12-27 PROCEDURE — 99421 PR E&M, ONLINE DIGIT, EST, < 7 DAYS, 5-10 MINS: ICD-10-PCS | Mod: ,,, | Performed by: STUDENT IN AN ORGANIZED HEALTH CARE EDUCATION/TRAINING PROGRAM

## 2023-12-27 RX ORDER — PROMETHAZINE HYDROCHLORIDE AND DEXTROMETHORPHAN HYDROBROMIDE 6.25; 15 MG/5ML; MG/5ML
5 SYRUP ORAL EVERY 6 HOURS PRN
Qty: 118 ML | Refills: 1 | Status: SHIPPED | OUTPATIENT
Start: 2023-12-27 | End: 2024-01-06

## 2023-12-27 RX ORDER — DOXYCYCLINE HYCLATE 100 MG
100 TABLET ORAL 2 TIMES DAILY
Qty: 14 EACH | Refills: 0 | Status: SHIPPED | OUTPATIENT
Start: 2023-12-27 | End: 2024-01-03

## 2023-12-27 RX ORDER — OXYMETAZOLINE HCL 0.05 %
2 SPRAY, NON-AEROSOL (ML) NASAL 2 TIMES DAILY
Qty: 6 ML | Refills: 0 | Status: SHIPPED | OUTPATIENT
Start: 2023-12-27 | End: 2023-12-30

## 2023-12-27 NOTE — PROGRESS NOTES
Patient ID: Greg Borja is a 38 y.o. female.    Chief Complaint: Cough          274}  The patient initiated a request through [employer] on 12/27/2023 for evaluation and management with a chief complaint of Cough     I evaluated the questionnaire submission on 12/27/2023 .    [facility] Peq Evisit Upper Respitatory/Cough Questionnaire    12/27/2023 11:24 AM CST - Filed by Patient   Do you agree to participate in an E-Visit? Yes   If you have any of the following symptoms, please present to your local ER or call 911:  I acknowledge   What is the main issue that you would like for your doctor to address today? Coughing causing pain in throat, Green mucus after coughing or blowing nose   Are you able to take your vital signs? No   Are you currently pregnant, could you be pregnant, or are you breast feeding? None of the above   What symptoms do you currently have?  Cough;  Headache;  Nasal Congestion;  Sore throat   Describe your cough: Productive (containing mucus);  Bothersome (interferes with daily activities)   Describe the mucus: Green   Have you had any of the following? None of the above   Have you ever smoked? I have smoked in the past   Have you had a fever? Yes   What has been the range of your fever? Less than 100.4   When did your symptoms first appear? 12/22/2023   In the last two weeks, have you been in close contact with someone who has COVID-19 or the Flu? No   In the last two weeks, have you worked or volunteered in a healthcare facility or as a ? Healthcare facilities include a hospital, medical or dental clinic, long-term care facility, or nursing home No   Do you live in a long-term care facility, nursing home, group home, or homeless shelter? No   List what you have done or taken to help your symptoms. Cold and flu, DayQuil/Nightquil, Tylenol, IBU, airborne   How severe are your symptoms? Moderate   Have your symptoms improved since they first appeared? Worse   Have you taken an at home  Covid test? No   Have you taken a Flu test? No   Have you been fully vaccinated for COVID? (2 Pfizer, 2 Moderna or 1 Ricky & Ricky vaccine injections) No   Have you received your first dose of the Pfizer or Moderna COVID vaccine? No   Have you recieved a Flu shot? Yes   When did you recieve your Flu shot? 11/10/2023   Do you have transportation to get tested for COVID if it is indicated and ordered for you at an [facility] location? Yes   Provide any information you feel is important to your history not asked above Cough has gotten a little better but seems cant get the mucus to come out   Please attach any relevant images or files           Active Problem List with Overview Notes    Diagnosis Date Noted    Chronic allergic rhinitis 2023    Multiple thyroid nodules 2021    Insulin pump status 2020    Type 1 diabetes mellitus with hyperglycemia 2019      Recent Labs Obtained:  Lab Results   Component Value Date    WBC 5.10 2022    HGB 14.2 2022    HCT 43.0 2022    MCV 93 2022     2022     2022    K 4.0 2022    GLU 57 (L) 2022    CREATININE 0.7 2022    EGFRNORACEVR >60.0 2022    HGBA1C 8.2 (H) 2023      Review of patient's allergies indicates:   Allergen Reactions    Hydrochlorothiazide Rash       Encounter Diagnoses   Name Primary?    Cough, unspecified type Yes    Subacute maxillary sinusitis         No orders of the defined types were placed in this encounter.     Medications Ordered This Encounter   Medications    doxycycline (VIBRA-TABS) 100 MG tablet     Sig: Take 1 tablet (100 mg total) by mouth 2 (two) times daily. for 7 days     Dispense:  14 each     Refill:  0    oxymetazoline (AFRIN, OXYMETAZOLINE,) 0.05 % nasal spray     Si sprays by Nasal route 2 (two) times daily. Do not take over 3 days due to rebound congestion for 3 days     Dispense:  6 mL     Refill:  0    promethazine-dextromethorphan  (PROMETHAZINE-DM) 6.25-15 mg/5 mL Syrp     Sig: Take 5 mLs by mouth every 6 (six) hours as needed (cough).     Dispense:  118 mL     Refill:  1        E-Visit Time Tracking:    Day 1 Time (in minutes): 7     Total Time (in minutes): 7    This is the extent of this pleasant patient's concerns at this present time. She did not feel chest pain upon exertion. No unilateral leg swelling, calf tenderness, or calf pain.    274}

## 2024-01-12 ENCOUNTER — PATIENT MESSAGE (OUTPATIENT)
Dept: ENDOCRINOLOGY | Facility: CLINIC | Age: 39
End: 2024-01-12
Payer: COMMERCIAL

## 2024-02-12 ENCOUNTER — PATIENT MESSAGE (OUTPATIENT)
Dept: ENDOCRINOLOGY | Facility: CLINIC | Age: 39
End: 2024-02-12
Payer: COMMERCIAL

## 2024-02-12 DIAGNOSIS — E10.65 TYPE 1 DIABETES MELLITUS WITH HYPERGLYCEMIA: Primary | ICD-10-CM

## 2024-03-01 ENCOUNTER — PATIENT MESSAGE (OUTPATIENT)
Dept: DIABETES | Facility: CLINIC | Age: 39
End: 2024-03-01

## 2024-03-01 ENCOUNTER — CLINICAL SUPPORT (OUTPATIENT)
Dept: DIABETES | Facility: CLINIC | Age: 39
End: 2024-03-01
Payer: COMMERCIAL

## 2024-03-01 VITALS — WEIGHT: 112.44 LBS | BODY MASS INDEX: 20.56 KG/M2

## 2024-03-01 DIAGNOSIS — E10.65 TYPE 1 DIABETES MELLITUS WITH HYPERGLYCEMIA: ICD-10-CM

## 2024-03-01 PROCEDURE — G0108 DIAB MANAGE TRN  PER INDIV: HCPCS | Mod: S$GLB,,, | Performed by: INTERNAL MEDICINE

## 2024-03-01 PROCEDURE — 99999 PR PBB SHADOW E&M-EST. PATIENT-LVL II: CPT | Mod: PBBFAC,,,

## 2024-03-01 NOTE — PROGRESS NOTES
Diabetes Care Specialist Progress Note  Author: Bertha Hung RN, CDE  Date: 3/1/2024    Program Intake  Reason for Diabetes Program Visit:: Initial Diabetes Assessment  Current diabetes risk level:: moderate  In the last 12 months, have you:: none  Permission to speak with others about care:: no  Continuous Glucose Monitoring  Patient has CGM: Yes  Personal CGM type:: Dexcom G6    Lab Results   Component Value Date    HGBA1C 8.2 (H) 06/22/2023       Clinical    Weight: 51 kg (112 lb 7 oz)       Body mass index is 20.56 kg/m².    Patient Health Rating  Compared to other people your age, how would you rate your health?: Good    Problem Review  Reviewed Problem List with Patient: yes  Active comorbidities affecting diabetes self-care.: no  Reviewed health maintenance: yes    Clinical Assessment  Current Diabetes Treatment: Insulin pump (Omnipod 5)  Have you ever experienced hypoglycemia (low blood sugar)?: no  Have you ever experienced hyperglycemia (high blood sugar)?: no    Medication Information  How do you obtain your medications?: Patient drives  How many days a week do you miss your medications?: Never  Do you sometimes have difficulty refilling your medications?: No  Medication adherence impacting ability to self-manage diabetes?: No         Nutritional Status  Diet: Regular  Change in appetite?: No  Recent Changes in Weight: No Recent Weight Change  Current nutritional status an area of need that is impacting patient's ability to self-manage diabetes?: No    Additional Social History    Support  Does anyone support you with your diabetes care?: yes  Who supports you?: spouse  Who takes you to your medical appointments?: self  Does the current support meet the patient's needs?: Yes  Is Support an area impacting ability to self-manage diabetes?: No    Access to Mass Media & Technology  Does the patient have access to any of the following devices or technologies?: Smart phone, Home computer, Internet  Access  Media or technology needs impacting ability to self-manage diabetes?: No    Cognitive/Behavioral Health  Alert and Oriented: Yes  Difficulty Thinking: No  Requires Prompting: No  Requires assistance for routine expression?: No  Cognitive or behavioral barriers impacting ability to self-manage diabetes?: No         Communication  Language preference: English  Hearing Problems: No  Vision Problems: No  Communication needs impacting ability to self-manage diabetes?: No    Health Literacy  Preferred Learning Method: Face to Face, Reading Materials, Hands On  How often do you need to have someone help you read instructions, pamphlets, or written material from your doctor or pharmacy?: Never  Health literacy needs impacting ability to self-manage diabetes?: No      Diabetes Self-Management Skills Assessment    Diabetes Disease Process/Treatment Options  Patient/caregiver able to state what happens when someone has diabetes.: yes  Patient/caregiver knows what type of diabetes they have.: yes  Diabetes Type : Type I  Patient/caregiver able to identify at least three signs and symptoms of diabetes.: yes  Identified signs and symptoms:: frequent infections, frequent urination, increased thirst  Patient able to identify at least three risk factors for diabetes.: yes  Diabetes Disease Process/Treatment Options: Skills Assessment Completed: Yes  Assessment indicates:: Adequate understanding  Area of need?: No    Nutrition/Healthy Eating  Method of carbohydrate measurement:: carb counting/reading labels  Patient can identify foods that impact blood sugar.: yes  Patient-identified foods:: fruit/fruit juice, starches (bread, pasta, rice, cereal), milk, soda, starchy vegetables (corn, peas, beans), sweets, yogurt  Nutrition/Healthy Eating Skills Assessment Completed:: Yes  Assessment indicates:: Adequate understanding  Area of need?: No    Physical Activity/Exercise  Patient's daily activity level:: moderately  active  Patient formally exercises outside of work.: no  Reasons for not exercising:: time constraints  Patient can identify forms of physical activity.: yes  Stated forms of physical activity:: any movement performed by muscles that uses energy, manual activity at work, recreational activities, chasing after children  Physical Activity/Exercise Skills Assessment Completed: : Yes  Assessment indicates:: Adequate understanding  Area of need?: No    Medications  Patient is able to describe current diabetes management routine.: yes  Diabetes management routine:: insulin pump  Patient is able to identify current diabetes medications, dosages, and appropriate timing of medications.: yes  Patient understands the purpose of the medications taken for diabetes.: yes  Patient reports problems or concerns with current medication regimen.: no  Medication Skills Assessment Completed:: Yes  Assessment indicates:: Instruction Needed  Area of need?: Yes    Home Blood Glucose Monitoring  Patient states that blood sugar is checked at home daily.: yes  Monitoring Method:: home glucometer  Blood glucose logs:: no, encouraged to keep logs, encouraged to bring logs to provider visits  Blood glucose logs reviewed today?: no  Personal CGM type:: Dexcom G6  Home Blood Glucose Monitoring Skills Assessment Completed: : Yes  Assessment indicates:: Instruction Needed (Dexcom G6 training)  Area of need?: Yes    Acute Complications  Patient is able to identify types of acute complications: Yes  Patient Identified:: Hypoglycemia  Patient is able to state the basic meaning of hypoglycemia?: Yes  Able to state the blood sugar range for hypoglycemia?: yes  Patient stated range:: under 70  Patient can identify general symptoms of hypoglycemia: yes  Patient identified:: shakiness  Able to state proper treatment of hypoglycemia?: yes  Patient identified:: 1/2 can soda/fruit juice, 5-6 pieces of hard candy  Acute Complications Skills Assessment  Completed: : Yes  Assessment indicates:: Adequate understanding  Area of need?: No    Chronic Complications  Patient can identify major chronic complications of diabetes.: yes  Stated chronic complications:: neuropathy/nerve damage  Patient can identify ways to prevent or delay diabetes complications.: yes  Stated ways to prevent complications:: having regular diabetic eye exams, controlling blood sugar  Patient is aware that having diabetes increases risk of heart disease?: No  Patient is taking statin?: No  Chronic Complications Skills Assessment Completed: : Yes  Assessment indicates:: Adequate understanding  Area of need?: No    Psychosocial/Coping  Patient can identify ways of coping with chronic disease.: yes  Patient-stated ways of coping with chronic disease:: support from loved ones  Psychosocial/Coping Skills Assessment Completed: : Yes  Assessment indicates:: Adequate understanding  Area of need?: No      Assessment Summary and Plan    Based on today's diabetes care assessment, the following areas of need were identified:          3/1/2024    12:01 AM   Social   Support No   Access to Mass Media/Tech No   Cognitive/Behavioral Health No   Communication No   Health Literacy No            3/1/2024    12:01 AM   Clinical   Medication Adherence No   Nutritional Status No            3/1/2024    12:01 AM   Diabetes Self-Management Skills   Diabetes Disease Process/Treatment Options No   Nutrition/Healthy Eating No   Physical Activity/Exercise No   Medication Yes, see care planning   Home Blood Glucose Monitoring Yes, started on Dexcom G6   Acute Complications No   Chronic Complications No   Psychosocial/Coping No          Today's interventions were provided through individual discussion, instruction, and written materials were provided.      Patient verbalized understanding of instruction and written materials.  Pt was able to return back demonstration of instructions today. Patient understood key points, needs  reinforcement and further instruction.     Diabetes Self-Management Care Plan:     Today's Diabetes Self-Management Care Plan was developed with Greg's input. Greg has agreed to work toward the following goal(s) to improve his/her overall diabetes control.       Care Plan: Diabetes Management   Updates made since 8/24/2022 12:00 AM          Problem: Medications            Goal: Patient Agrees to take Diabetes Medication(s) per insulin pump as prescribed.     Start Date: 9/23/2022   Expected End Date: 10/24/2022   Priority: High   Barriers: No Barriers Identified   Note:    Patient seen in clinic today for evaluation of DASH download and to talk about upgrading to the Omnipod 5. DASH was downloaded and evaluated. Currently only using glucometer to check glucose. Admits to multiple missed bolus doses. Often eats and forgets to enter the carbs into the pump. Does give correction when needed, but usually is not enough. Today we linked her current DASH to the [facility] account. To encouraged compliance with bolusing, programmed 3 meal time reminders to help her give her doses.     We talked about the Omnipod 5. She was shown a demo simulator. Explained that it will adjust insulin to get to a prescribed goal target with Dexcom integration. Explained that she does need to enter all her carbs into the pump so that she can get the correct dose of insulin.      Patient would like to move forward with the Dexcom G6. Explained she needs that to get the full benefit of the new system.       Update to care planning 03/01/2024:  Greg was upgraded from the Dash system to the Omnipod 5 on her own.  Here today to link her Dexcom G6 sensor to pump.  Reviewed basics of O5 including how the integration with Dexcom will work.  Advised that she needs to be sure she is in [employer].  Dexcom G6 was started today and linked to her controller.   Advised about the 2 hr warm up period and explained that she will still get  insulin during that time.  Pump will go into Limited mode during that time.        Task: Discussed guidelines for preventing, detecting and treating hypoglycemia and hyperglycemia and reviewed the importance of meal and medication timing with diabetes mediations for prevention of hypoglycemia and maximum drug benefit. Completed 9/23/2022          Problem: Healthy Eating            Goal: Eat 2-3 meals daily with 30-45g/2-3 servings of Carbohydrate per meal. Limit snacking in between meal to 1 serving (15 grams).     Start Date: 9/23/2022   Expected End Date: 10/24/2022   Priority: Medium   Barriers: No Barriers Identified   Note:    Reviewed meal planning and general nutritional counseling with regards to diabetes management. Reviewed basic Carbohydrate Counting principles--Food groups, label reading, use of dry measuring cups for accurate portion sizes. Encouraged to use measuring cups and food labels to accurately calculation her portions. Carb counting materials were provided.        Update to care planning 3/1/2024:  Reinforced the above topics today.  Advised to always enter the carb amount with the blood glucose into her pump when bolusing    Task: Reviewed the sources and role of Carbohydrate, Protein, and Fat and how each nutrient impacts blood sugar. Completed 9/23/2022          Task: Provided visual examples using dry measuring cups, food models, and other familiar objects such as computer mouse, deck or cards, tennis ball etc. to help with visualization of portions. Completed 9/23/2022          Task: Explained how to count carbohydrates using the food label and the use of dry measuring cups for accurate carb counting. Completed 9/23/2022          Task: Discussed strategies for choosing healthier menu options when dining out. Completed 9/23/2022      Task: Review the importance of balancing carbohydrates with each meal using portion control techniques to count servings of carbohydrate and label reading to  identify serving size and amount of total carbs per serving. Completed 9/23/2022          Task: Provided Sample plate method and reviewed the use of the plate to estimate amounts of carbohydrate per meal. Completed 9/23/2022              Follow Up Plan     Will f/u with Endo as needed    Today's care plan and follow up schedule was discussed with patient.  Greg verbalized understanding of the care plan, goals, and agrees to follow up plan.        The patient was encouraged to communicate with his/her health care provider/physician and care team regarding his/her condition(s) and treatment.  I provided the patient with my contact information today and encouraged to contact me via phone or [facility] Patient Portal as needed.     Length of Visit   Total Time: 75 Minutes

## 2024-04-22 ENCOUNTER — PATIENT MESSAGE (OUTPATIENT)
Dept: ENDOCRINOLOGY | Facility: CLINIC | Age: 39
End: 2024-04-22
Payer: COMMERCIAL

## 2024-04-22 ENCOUNTER — PATIENT MESSAGE (OUTPATIENT)
Dept: FAMILY MEDICINE | Facility: CLINIC | Age: 39
End: 2024-04-22
Payer: COMMERCIAL

## 2024-04-23 ENCOUNTER — TELEPHONE (OUTPATIENT)
Dept: ENDOCRINOLOGY | Facility: CLINIC | Age: 39
End: 2024-04-23
Payer: COMMERCIAL

## 2024-04-23 DIAGNOSIS — E04.2 MULTIPLE THYROID NODULES: ICD-10-CM

## 2024-04-23 DIAGNOSIS — E10.65 TYPE 1 DIABETES MELLITUS WITH HYPERGLYCEMIA: Primary | ICD-10-CM

## 2024-04-23 DIAGNOSIS — R53.83 FATIGUE, UNSPECIFIED TYPE: ICD-10-CM

## 2024-04-24 ENCOUNTER — LAB VISIT (OUTPATIENT)
Dept: LAB | Facility: HOSPITAL | Age: 39
End: 2024-04-24
Attending: INTERNAL MEDICINE
Payer: COMMERCIAL

## 2024-04-24 DIAGNOSIS — E10.65 TYPE 1 DIABETES MELLITUS WITH HYPERGLYCEMIA: ICD-10-CM

## 2024-04-24 DIAGNOSIS — R53.83 FATIGUE, UNSPECIFIED TYPE: ICD-10-CM

## 2024-04-24 LAB
25(OH)D3+25(OH)D2 SERPL-MCNC: 34 NG/ML (ref 30–96)
ALBUMIN SERPL BCP-MCNC: 4.2 G/DL (ref 3.5–5.2)
ALBUMIN/CREAT UR: NORMAL UG/MG (ref 0–30)
ALP SERPL-CCNC: 70 U/L (ref 55–135)
ALT SERPL W/O P-5'-P-CCNC: 13 U/L (ref 10–44)
ANION GAP SERPL CALC-SCNC: 10 MMOL/L (ref 8–16)
AST SERPL-CCNC: 16 U/L (ref 10–40)
BASOPHILS # BLD AUTO: 0.08 K/UL (ref 0–0.2)
BASOPHILS NFR BLD: 1.3 % (ref 0–1.9)
BILIRUB SERPL-MCNC: 0.7 MG/DL (ref 0.1–1)
BUN SERPL-MCNC: 11 MG/DL (ref 6–20)
CALCIUM SERPL-MCNC: 9 MG/DL (ref 8.7–10.5)
CHLORIDE SERPL-SCNC: 105 MMOL/L (ref 95–110)
CO2 SERPL-SCNC: 25 MMOL/L (ref 23–29)
CREAT SERPL-MCNC: 0.9 MG/DL (ref 0.5–1.4)
CREAT UR-MCNC: 21 MG/DL (ref 15–325)
DIFFERENTIAL METHOD BLD: ABNORMAL
EOSINOPHIL # BLD AUTO: 0 K/UL (ref 0–0.5)
EOSINOPHIL NFR BLD: 0.6 % (ref 0–8)
ERYTHROCYTE [DISTWIDTH] IN BLOOD BY AUTOMATED COUNT: 11.4 % (ref 11.5–14.5)
EST. GFR  (NO RACE VARIABLE): >60 ML/MIN/1.73 M^2
ESTIMATED AVG GLUCOSE: 146 MG/DL (ref 68–131)
GLUCOSE SERPL-MCNC: 147 MG/DL (ref 70–110)
HBA1C MFR BLD: 6.7 % (ref 4–5.6)
HCT VFR BLD AUTO: 42.3 % (ref 37–48.5)
HGB BLD-MCNC: 14.3 G/DL (ref 12–16)
IMM GRANULOCYTES # BLD AUTO: 0.01 K/UL (ref 0–0.04)
IMM GRANULOCYTES NFR BLD AUTO: 0.2 % (ref 0–0.5)
LYMPHOCYTES # BLD AUTO: 1.7 K/UL (ref 1–4.8)
LYMPHOCYTES NFR BLD: 27.6 % (ref 18–48)
MCH RBC QN AUTO: 30.1 PG (ref 27–31)
MCHC RBC AUTO-ENTMCNC: 33.8 G/DL (ref 32–36)
MCV RBC AUTO: 89 FL (ref 82–98)
MICROALBUMIN UR DL<=1MG/L-MCNC: <5 UG/ML
MONOCYTES # BLD AUTO: 0.4 K/UL (ref 0.3–1)
MONOCYTES NFR BLD: 5.8 % (ref 4–15)
NEUTROPHILS # BLD AUTO: 4 K/UL (ref 1.8–7.7)
NEUTROPHILS NFR BLD: 64.5 % (ref 38–73)
NRBC BLD-RTO: 0 /100 WBC
PLATELET # BLD AUTO: 324 K/UL (ref 150–450)
PMV BLD AUTO: 10.2 FL (ref 9.2–12.9)
POTASSIUM SERPL-SCNC: 4.3 MMOL/L (ref 3.5–5.1)
PROT SERPL-MCNC: 7 G/DL (ref 6–8.4)
RBC # BLD AUTO: 4.75 M/UL (ref 4–5.4)
SODIUM SERPL-SCNC: 140 MMOL/L (ref 136–145)
T4 FREE SERPL-MCNC: 1.17 NG/DL (ref 0.71–1.51)
TSH SERPL DL<=0.005 MIU/L-ACNC: 0.96 UIU/ML (ref 0.4–4)
VIT B12 SERPL-MCNC: 1224 PG/ML (ref 210–950)
WBC # BLD AUTO: 6.16 K/UL (ref 3.9–12.7)

## 2024-04-24 PROCEDURE — 84439 ASSAY OF FREE THYROXINE: CPT | Performed by: INTERNAL MEDICINE

## 2024-04-24 PROCEDURE — 80053 COMPREHEN METABOLIC PANEL: CPT | Performed by: INTERNAL MEDICINE

## 2024-04-24 PROCEDURE — 82607 VITAMIN B-12: CPT | Performed by: INTERNAL MEDICINE

## 2024-04-24 PROCEDURE — 36415 COLL VENOUS BLD VENIPUNCTURE: CPT | Performed by: INTERNAL MEDICINE

## 2024-04-24 PROCEDURE — 83036 HEMOGLOBIN GLYCOSYLATED A1C: CPT | Performed by: INTERNAL MEDICINE

## 2024-04-24 PROCEDURE — 82043 UR ALBUMIN QUANTITATIVE: CPT | Performed by: INTERNAL MEDICINE

## 2024-04-24 PROCEDURE — 82306 VITAMIN D 25 HYDROXY: CPT | Performed by: INTERNAL MEDICINE

## 2024-04-24 PROCEDURE — 85025 COMPLETE CBC W/AUTO DIFF WBC: CPT | Performed by: INTERNAL MEDICINE

## 2024-04-24 PROCEDURE — 84443 ASSAY THYROID STIM HORMONE: CPT | Performed by: INTERNAL MEDICINE

## 2024-06-24 ENCOUNTER — PATIENT MESSAGE (OUTPATIENT)
Dept: ENDOCRINOLOGY | Facility: CLINIC | Age: 39
End: 2024-06-24
Payer: COMMERCIAL

## 2024-06-25 ENCOUNTER — TELEPHONE (OUTPATIENT)
Dept: ENDOCRINOLOGY | Facility: CLINIC | Age: 39
End: 2024-06-25
Payer: COMMERCIAL

## 2024-06-25 DIAGNOSIS — E10.65 TYPE 1 DIABETES MELLITUS WITH HYPERGLYCEMIA: Primary | ICD-10-CM

## 2024-07-01 DIAGNOSIS — E10.65 TYPE 1 DIABETES MELLITUS WITH HYPERGLYCEMIA: ICD-10-CM

## 2024-07-01 RX ORDER — INSULIN PMP CART,AUT,G6/7,CNTR
EACH SUBCUTANEOUS
Qty: 30 EACH | Refills: 3 | Status: SHIPPED | OUTPATIENT
Start: 2024-07-01

## 2024-07-31 ENCOUNTER — OFFICE VISIT (OUTPATIENT)
Dept: ENDOCRINOLOGY | Facility: CLINIC | Age: 39
End: 2024-07-31
Payer: COMMERCIAL

## 2024-07-31 VITALS
BODY MASS INDEX: 21.51 KG/M2 | SYSTOLIC BLOOD PRESSURE: 120 MMHG | WEIGHT: 117.63 LBS | OXYGEN SATURATION: 96 % | DIASTOLIC BLOOD PRESSURE: 70 MMHG | HEART RATE: 76 BPM

## 2024-07-31 DIAGNOSIS — E10.65 TYPE 1 DIABETES MELLITUS WITH HYPERGLYCEMIA: Primary | ICD-10-CM

## 2024-07-31 DIAGNOSIS — E04.2 MULTIPLE THYROID NODULES: ICD-10-CM

## 2024-07-31 DIAGNOSIS — Z96.41 INSULIN PUMP STATUS: ICD-10-CM

## 2024-07-31 PROCEDURE — 99999 PR PBB SHADOW E&M-EST. PATIENT-LVL III: CPT | Mod: PBBFAC,,, | Performed by: INTERNAL MEDICINE

## 2024-07-31 RX ORDER — BLOOD-GLUCOSE SENSOR
1 EACH MISCELLANEOUS
Qty: 9 EACH | Refills: 3 | Status: SHIPPED | OUTPATIENT
Start: 2024-07-31 | End: 2025-07-31

## 2024-07-31 RX ORDER — INSULIN ASPART 100 [IU]/ML
INJECTION, SOLUTION INTRAVENOUS; SUBCUTANEOUS
Qty: 60 ML | Refills: 11 | Status: SHIPPED | OUTPATIENT
Start: 2024-07-31

## 2024-07-31 NOTE — PROGRESS NOTES
Subjective:      Chief Complaint: Type 1 diabetes mellitus    HPI: Greg Borja is a 38 y.o. female who is here for follow-up evaluation for type 1 diabetes mellitus.    Last seen by me 6/23/2023.    With regards to the type 1 diabetes:    Has been under stress lately related to her job. Was laid a few months ago and recently started a new job in June.     The patient was initially diagnosed with Type 1 diabetes mellitus: Age 11    Known diabetic complications:  Hypoglycemia and hyperglycemia  Cardiovascular risk factors: Diabetes    Current diabetic medications include:   Omnipod with U-100 Novolog:     Basal:  12A:     0.80  430A: 0.90  8P: 0.75     ICH:  12A:     1:10  10A:     1:9  9P:       1:10     ISF:  12A:     1:40     IAT:      2.5 hours     Target: 100-120        Pump type:    Infusion set type: Omnipod Dash  Change infusion set: Every 3 days  Change insertion site: with change of infusion set.   Location of insertion site: Abdomen, hips     Pump data were downloaded and reviewed:  Using automated delivery 80%  Fasting BG mostly at goal.  Post-prandial fluctuations noted - usually after missed/late boluses.  TIR 59%  Hypo 1%          BP Readings from Last 3 Encounters:   07/31/24 120/70   06/22/23 120/70   02/11/22 120/80     Use of ACE/ARB: No, Not Indicated          Wt Readings from Last 10 Encounters:   07/31/24 53.4 kg (117 lb 9.9 oz)   03/01/24 51 kg (112 lb 7 oz)   06/22/23 50.3 kg (110 lb 14.3 oz)   09/23/22 51.1 kg (112 lb 10.5 oz)   02/11/22 53.2 kg (117 lb 4.6 oz)   06/07/21 50 kg (110 lb 3.7 oz)   03/08/21 52.7 kg (116 lb 2.9 oz)   09/23/20 53.7 kg (118 lb 4.4 oz)   08/03/20 53.9 kg (118 lb 15 oz)   07/08/20 54.5 kg (120 lb 2.4 oz)     Diabetes Management Status    Statin: Not taking  ACE/ARB: Not taking    Screening or Prevention Patient's value Goal Complete/Controlled?   HgA1C Testing and Control   Lab Results   Component Value Date    HGBA1C 6.7 (H) 04/24/2024      Annually/Less than 8%  "Yes   Lipid profile : 06/22/2023 Annually No   LDL control Lab Results   Component Value Date    LDLCALC 78.0 02/12/2022    Annually/Less than 100 mg/dl  No   Nephropathy screening Lab Results   Component Value Date    LABMICR <5.0 04/24/2024     No results found for: "PROTEINUA"  No results found for: "UTPCR"   Annually Yes   Blood pressure BP Readings from Last 1 Encounters:   07/31/24 120/70    Less than 140/90 Yes   Dilated retinal exam : 04/30/2024 Annually No   Foot exam   : 07/31/2024 Annually Yes        No plans for additional pregnancies.    With regards to the thyroid nodules:    Thyroid nodules originally found by self palpation and evaluated by U/S on 3/25/2021    Last ultrasound in 3/25/2021 was independently reviewed:     1.80 x 1.23 x 1.25 cm right inferior lobe nodule. The nodule is mostly solid with small cystic components with a isoechoic echotexture. Vascularity is Grade 2 (peripheral). Borders are well-defined. Microcalcifications are not present.     1.97 x 1.33 x 1.66 cm right middle lobe nodule. The nodule is mostly solid with small cystic components with a isoechoic echotexture. Vascularity is Grade 2 (peripheral). Borders are well-defined. Microcalcifications are not present.      No   Yes  [x]    [] Preexisting thyroid disease    Compressive Symptoms:  No  Yes  [x]    [] Anterior neck pressure  [x]    [] Dysphagia  [x]    [] Voice changes    Risk Factors:  No   Yes  [x]    [] Radiation to head or neck for any treatment of cancer or exposure to radiation  [x]    [] Personal history of colon or breast cancer  [x]    [] Tobacco use  [x]    [] Family history of thyroid cancer      Previous biopsy results:  Right middle lobe: [place] II (Benign) in 4/21  Right lower lobe: [place] II (Benign) in 4/21      Lab Results   Component Value Date    TSH 0.962 04/24/2024    TSH 1.362 02/05/2021    TSH 0.981 08/03/2020    TSH 0.867 08/30/2019       Reviewed past medical, family, social history and " updated as appropriate.    Objective:     Vitals:    07/31/24 1102   BP: 120/70   Pulse: 76        BP Readings from Last 5 Encounters:   07/31/24 120/70   06/22/23 120/70   02/11/22 120/80   06/07/21 122/70   03/08/21 131/79     Physical Exam  Cardiovascular:      Pulses:           Dorsalis pedis pulses are 2+ on the right side and 2+ on the left side.        Posterior tibial pulses are 2+ on the right side and 2+ on the left side.   Musculoskeletal:      Right foot: No deformity.      Left foot: No deformity.   Feet:      Right foot:      Protective Sensation: 7 sites tested.  7 sites sensed.      Skin integrity: No ulcer, blister, skin breakdown, erythema, warmth, callus, dry skin or fissure.      Toenail Condition: Right toenails are normal.      Left foot:      Protective Sensation: 7 sites tested.  7 sites sensed.      Skin integrity: No ulcer, blister, skin breakdown, erythema, warmth, callus, dry skin or fissure.      Toenail Condition: Left toenails are normal.         Wt Readings from Last 10 Encounters:   07/31/24 1102 53.4 kg (117 lb 9.9 oz)   03/01/24 1021 51 kg (112 lb 7 oz)   06/22/23 1601 50.3 kg (110 lb 14.3 oz)   09/23/22 1528 51.1 kg (112 lb 10.5 oz)   02/11/22 1442 53.2 kg (117 lb 4.6 oz)   06/07/21 1022 50 kg (110 lb 3.7 oz)   03/08/21 1445 52.7 kg (116 lb 2.9 oz)   09/23/20 0834 53.7 kg (118 lb 4.4 oz)   08/03/20 1011 53.9 kg (118 lb 15 oz)   07/08/20 1607 54.5 kg (120 lb 2.4 oz)       Lab Results   Component Value Date    HGBA1C 6.7 (H) 04/24/2024    HGBA1C 8.2 (H) 06/22/2023    HGBA1C 7.4 (H) 08/24/2022    HGBA1C 7.5 (H) 02/12/2022     Lab Results   Component Value Date    CHOL 141 02/12/2022    CHOL 133 02/05/2021    HDL 57 02/12/2022    HDL 45 02/05/2021    LDLCALC 78.0 02/12/2022    LDLCALC 79.6 02/05/2021    TRIG 30 02/12/2022    TRIG 42 02/05/2021    CHOLHDL 40.4 02/12/2022    CHOLHDL 33.8 02/05/2021     Lab Results   Component Value Date     04/24/2024    K 4.3 04/24/2024      04/24/2024    CO2 25 04/24/2024     (H) 04/24/2024    BUN 11 04/24/2024    CREATININE 0.9 04/24/2024    CALCIUM 9.0 04/24/2024    PROT 7.0 04/24/2024    ALBUMIN 4.2 04/24/2024    BILITOT 0.7 04/24/2024    ALKPHOS 70 04/24/2024    AST 16 04/24/2024    ALT 13 04/24/2024    ANIONGAP 10 04/24/2024    ESTGFRAFRICA >60.0 02/12/2022    EGFRNONAA >60.0 02/12/2022    TSH 0.962 04/24/2024      Lab Results   Component Value Date    MICALBCREAT Unable to calculate 04/24/2024       Assessment/Plan:     Type 1 diabetes mellitus with hyperglycemia  Reviewed goals of therapy are to get the best control we can without hypoglycemia.    Fasting BG at goal on OP5. Post-prandial doing well when she boluses appropriately.  I did not change any of her pump settings today.  Recommended bolusing 30 minutes prior to meals for all carbohydrates.    Close adherence to lifestyle changes recommended.      Eyes: UTD  Feet: UTD  Kidneys:  Microalbumin normal on last check.  Creatinine is normal.  HbA1c:  Check now  Lipids:  LDL at goal - recheck yearly  Thyroid:  Normal  Celiac: had celiac testing done at [facility] within the past few years - negative.  Pregnancy: No plans for pregnancy, but she is aware of the glycemic goals being very stringent and that we would want her A1c below 6.0% before attempting pregnancy.  Glucagon:  Yes    Insulin pump status  See above.  We will switch to Dexcom G7 now that is compatible with Omnipod 5.  Advised that she needs to do a software update on her controller.    Multiple thyroid nodules  Due for thyroid ultrasound.  Due to financial issues recently she prefers to hold off on scheduling.  I told her just to reach out to our office staff to schedule when she is ready.         Notes:   Return to clinic in 6 months  Labs today

## 2024-07-31 NOTE — ASSESSMENT & PLAN NOTE
Due for thyroid ultrasound.  Due to financial issues recently she prefers to hold off on scheduling.  I told her just to reach out to our office staff to schedule when she is ready.

## 2024-07-31 NOTE — ASSESSMENT & PLAN NOTE
See above.  We will switch to Dexcom G7 now that is compatible with Omnipod 5.  Advised that she needs to do a software update on her controller.

## 2024-07-31 NOTE — ASSESSMENT & PLAN NOTE
Reviewed goals of therapy are to get the best control we can without hypoglycemia.    Fasting BG at goal on OP5. Post-prandial doing well when she boluses appropriately.  I did not change any of her pump settings today.  Recommended bolusing 30 minutes prior to meals for all carbohydrates.    Close adherence to lifestyle changes recommended.      Eyes: UTD  Feet: UTD  Kidneys:  Microalbumin normal on last check.  Creatinine is normal.  HbA1c:  Check now  Lipids:  LDL at goal - recheck yearly  Thyroid:  Normal  Celiac: had celiac testing done at [facility] within the past few years - negative.  Pregnancy: No plans for pregnancy, but she is aware of the glycemic goals being very stringent and that we would want her A1c below 6.0% before attempting pregnancy.  Glucagon:  Yes

## 2024-08-08 ENCOUNTER — E-VISIT (OUTPATIENT)
Dept: FAMILY MEDICINE | Facility: CLINIC | Age: 39
End: 2024-08-08
Payer: COMMERCIAL

## 2024-08-08 DIAGNOSIS — J01.00 SUBACUTE MAXILLARY SINUSITIS: Primary | ICD-10-CM

## 2024-08-08 DIAGNOSIS — R51.9 SINUS HEADACHE: ICD-10-CM

## 2024-08-08 RX ORDER — AMOXICILLIN AND CLAVULANATE POTASSIUM 875; 125 MG/1; MG/1
1 TABLET, FILM COATED ORAL 2 TIMES DAILY
Qty: 14 TABLET | Refills: 0 | Status: SHIPPED | OUTPATIENT
Start: 2024-08-08 | End: 2024-08-15

## 2024-08-08 RX ORDER — DESLORATADINE 5 MG/1
5 TABLET ORAL DAILY
Qty: 7 TABLET | Refills: 0 | Status: SHIPPED | OUTPATIENT
Start: 2024-08-08 | End: 2024-08-15

## 2024-08-08 RX ORDER — OXYMETAZOLINE HCL 0.05 %
2 SPRAY, NON-AEROSOL (ML) NASAL 2 TIMES DAILY
Qty: 6 ML | Refills: 0 | Status: SHIPPED | OUTPATIENT
Start: 2024-08-08 | End: 2024-08-11

## 2024-10-01 ENCOUNTER — PATIENT OUTREACH (OUTPATIENT)
Dept: ADMINISTRATIVE | Facility: HOSPITAL | Age: 39
End: 2024-10-01
Payer: COMMERCIAL

## 2025-05-13 ENCOUNTER — PATIENT MESSAGE (OUTPATIENT)
Dept: ENDOCRINOLOGY | Facility: CLINIC | Age: 40
End: 2025-05-13
Payer: COMMERCIAL

## 2025-05-13 DIAGNOSIS — E10.65 TYPE 1 DIABETES MELLITUS WITH HYPERGLYCEMIA: ICD-10-CM

## 2025-05-13 RX ORDER — BLOOD SUGAR DIAGNOSTIC
STRIP MISCELLANEOUS
Qty: 400 STRIP | Refills: 11 | Status: SHIPPED | OUTPATIENT
Start: 2025-05-13

## 2025-06-23 ENCOUNTER — PATIENT OUTREACH (OUTPATIENT)
Dept: ADMINISTRATIVE | Facility: HOSPITAL | Age: 40
End: 2025-06-23
Payer: COMMERCIAL

## 2025-06-23 DIAGNOSIS — Z13.220 SCREENING FOR HYPERLIPIDEMIA: ICD-10-CM

## 2025-06-23 DIAGNOSIS — E10.65 TYPE 1 DIABETES MELLITUS WITH HYPERGLYCEMIA: Primary | Chronic | ICD-10-CM

## 2025-06-23 NOTE — PROGRESS NOTES
Left message for patient to schedule.  HM and immunization's reviewed and updated.  Patient is due for Mammo, Statin, Diabetic Eye Exam, Lipid, Hg A1C, Micro/Urine.  Please help schedule or if already done please get information to get records.

## 2025-07-25 DIAGNOSIS — E10.65 TYPE 1 DIABETES MELLITUS WITH HYPERGLYCEMIA: Primary | ICD-10-CM

## 2025-07-25 RX ORDER — INSULIN PMP CART,AUT,G6/7,CNTR
EACH SUBCUTANEOUS
Qty: 10 EACH | Refills: 2 | Status: SHIPPED | OUTPATIENT
Start: 2025-07-25

## 2025-07-25 RX ORDER — INSULIN PMP CART,AUT,G6/7,CNTR
1 EACH SUBCUTANEOUS
Qty: 15 EACH | Refills: 11 | Status: SHIPPED | OUTPATIENT
Start: 2025-07-25 | End: 2025-07-25 | Stop reason: SDUPTHER

## 2025-07-25 RX ORDER — INSULIN PMP CART,AUT,G6/7,CNTR
1 EACH SUBCUTANEOUS
Qty: 15 EACH | Refills: 11 | Status: SHIPPED | OUTPATIENT
Start: 2025-07-25

## 2025-08-18 ENCOUNTER — OFFICE VISIT (OUTPATIENT)
Dept: INTERNAL MEDICINE | Facility: CLINIC | Age: 40
End: 2025-08-18
Payer: COMMERCIAL

## 2025-08-18 VITALS
HEART RATE: 76 BPM | OXYGEN SATURATION: 100 % | WEIGHT: 120.38 LBS | TEMPERATURE: 98 F | BODY MASS INDEX: 21.33 KG/M2 | RESPIRATION RATE: 18 BRPM | DIASTOLIC BLOOD PRESSURE: 78 MMHG | HEIGHT: 63 IN | SYSTOLIC BLOOD PRESSURE: 138 MMHG

## 2025-08-18 DIAGNOSIS — H69.92 ETD (EUSTACHIAN TUBE DYSFUNCTION), LEFT: ICD-10-CM

## 2025-08-18 DIAGNOSIS — H66.91 RIGHT ACUTE OTITIS MEDIA: Primary | ICD-10-CM

## 2025-08-18 DIAGNOSIS — E10.65 TYPE 1 DIABETES MELLITUS WITH HYPERGLYCEMIA: Chronic | ICD-10-CM

## 2025-08-18 PROCEDURE — 99999 PR PBB SHADOW E&M-EST. PATIENT-LVL IV: CPT | Mod: PBBFAC,,,

## 2025-08-18 PROCEDURE — 3075F SYST BP GE 130 - 139MM HG: CPT | Mod: CPTII,S$GLB,, | Performed by: INTERNAL MEDICINE

## 2025-08-18 PROCEDURE — 1159F MED LIST DOCD IN RCRD: CPT | Mod: CPTII,S$GLB,, | Performed by: INTERNAL MEDICINE

## 2025-08-18 PROCEDURE — 99214 OFFICE O/P EST MOD 30 MIN: CPT | Mod: S$GLB,,, | Performed by: INTERNAL MEDICINE

## 2025-08-18 PROCEDURE — 3008F BODY MASS INDEX DOCD: CPT | Mod: CPTII,S$GLB,, | Performed by: INTERNAL MEDICINE

## 2025-08-18 PROCEDURE — 3078F DIAST BP <80 MM HG: CPT | Mod: CPTII,S$GLB,, | Performed by: INTERNAL MEDICINE

## 2025-08-18 RX ORDER — NEOMYCIN SULFATE, POLYMYXIN B SULFATE AND HYDROCORTISONE 10; 3.5; 1 MG/ML; MG/ML; [USP'U]/ML
4 SUSPENSION/ DROPS AURICULAR (OTIC) 3 TIMES DAILY
Qty: 10 ML | Refills: 1 | Status: SHIPPED | OUTPATIENT
Start: 2025-08-18

## 2025-08-20 ENCOUNTER — OFFICE VISIT (OUTPATIENT)
Dept: ENDOCRINOLOGY | Facility: CLINIC | Age: 40
End: 2025-08-20
Payer: COMMERCIAL

## 2025-08-20 ENCOUNTER — LAB VISIT (OUTPATIENT)
Dept: LAB | Facility: HOSPITAL | Age: 40
End: 2025-08-20
Payer: COMMERCIAL

## 2025-08-20 VITALS
SYSTOLIC BLOOD PRESSURE: 126 MMHG | HEIGHT: 63 IN | HEART RATE: 89 BPM | DIASTOLIC BLOOD PRESSURE: 80 MMHG | OXYGEN SATURATION: 99 % | BODY MASS INDEX: 21.64 KG/M2 | WEIGHT: 122.13 LBS

## 2025-08-20 DIAGNOSIS — E04.2 MULTIPLE THYROID NODULES: ICD-10-CM

## 2025-08-20 DIAGNOSIS — E10.65 TYPE 1 DIABETES MELLITUS WITH HYPERGLYCEMIA: Primary | ICD-10-CM

## 2025-08-20 DIAGNOSIS — E10.65 TYPE 1 DIABETES MELLITUS WITH HYPERGLYCEMIA: ICD-10-CM

## 2025-08-20 DIAGNOSIS — Z96.41 INSULIN PUMP STATUS: Chronic | ICD-10-CM

## 2025-08-20 LAB
ALBUMIN SERPL BCP-MCNC: 4.4 G/DL (ref 3.5–5.2)
ALP SERPL-CCNC: 78 UNIT/L (ref 40–150)
ALT SERPL W/O P-5'-P-CCNC: 16 UNIT/L (ref 0–55)
ANION GAP (OHS): 10 MMOL/L (ref 8–16)
AST SERPL-CCNC: 22 UNIT/L (ref 0–50)
BILIRUB SERPL-MCNC: 0.6 MG/DL (ref 0.1–1)
BUN SERPL-MCNC: 12 MG/DL (ref 6–20)
CALCIUM SERPL-MCNC: 9.1 MG/DL (ref 8.7–10.5)
CHLORIDE SERPL-SCNC: 104 MMOL/L (ref 95–110)
CHOLEST SERPL-MCNC: 189 MG/DL (ref 120–199)
CHOLEST/HDLC SERPL: 3 {RATIO} (ref 2–5)
CO2 SERPL-SCNC: 26 MMOL/L (ref 23–29)
CREAT SERPL-MCNC: 0.8 MG/DL (ref 0.5–1.4)
EAG (OHS): 180 MG/DL (ref 68–131)
GFR SERPLBLD CREATININE-BSD FMLA CKD-EPI: >60 ML/MIN/1.73/M2
GLUCOSE SERPL-MCNC: 73 MG/DL (ref 70–110)
HBA1C MFR BLD: 7.9 % (ref 4–5.6)
HDLC SERPL-MCNC: 64 MG/DL (ref 40–75)
HDLC SERPL: 33.9 % (ref 20–50)
LDLC SERPL CALC-MCNC: 108.4 MG/DL (ref 63–159)
NONHDLC SERPL-MCNC: 125 MG/DL
POTASSIUM SERPL-SCNC: 3.8 MMOL/L (ref 3.5–5.1)
PROT SERPL-MCNC: 7.4 GM/DL (ref 6–8.4)
SODIUM SERPL-SCNC: 140 MMOL/L (ref 136–145)
TRIGL SERPL-MCNC: 83 MG/DL (ref 30–150)
TSH SERPL-ACNC: 1.14 UIU/ML (ref 0.4–4)

## 2025-08-20 PROCEDURE — 82040 ASSAY OF SERUM ALBUMIN: CPT

## 2025-08-20 PROCEDURE — 1159F MED LIST DOCD IN RCRD: CPT | Mod: CPTII,S$GLB,,

## 2025-08-20 PROCEDURE — 36415 COLL VENOUS BLD VENIPUNCTURE: CPT

## 2025-08-20 PROCEDURE — 82465 ASSAY BLD/SERUM CHOLESTEROL: CPT

## 2025-08-20 PROCEDURE — 3074F SYST BP LT 130 MM HG: CPT | Mod: CPTII,S$GLB,,

## 2025-08-20 PROCEDURE — G2211 COMPLEX E/M VISIT ADD ON: HCPCS | Mod: S$GLB,,,

## 2025-08-20 PROCEDURE — 3008F BODY MASS INDEX DOCD: CPT | Mod: CPTII,S$GLB,,

## 2025-08-20 PROCEDURE — 99999 PR PBB SHADOW E&M-EST. PATIENT-LVL IV: CPT | Mod: PBBFAC,,,

## 2025-08-20 PROCEDURE — 99214 OFFICE O/P EST MOD 30 MIN: CPT | Mod: S$GLB,,,

## 2025-08-20 PROCEDURE — 84443 ASSAY THYROID STIM HORMONE: CPT

## 2025-08-20 PROCEDURE — 83036 HEMOGLOBIN GLYCOSYLATED A1C: CPT

## 2025-08-20 PROCEDURE — 3079F DIAST BP 80-89 MM HG: CPT | Mod: CPTII,S$GLB,,

## 2025-08-20 RX ORDER — INSULIN ASPART 100 [IU]/ML
INJECTION, SOLUTION INTRAVENOUS; SUBCUTANEOUS
Qty: 60 ML | Refills: 11 | Status: SHIPPED | OUTPATIENT
Start: 2025-08-20